# Patient Record
Sex: FEMALE | HISPANIC OR LATINO | URBAN - METROPOLITAN AREA
[De-identification: names, ages, dates, MRNs, and addresses within clinical notes are randomized per-mention and may not be internally consistent; named-entity substitution may affect disease eponyms.]

---

## 2020-02-18 ENCOUNTER — EMERGENCY (EMERGENCY)
Facility: HOSPITAL | Age: 20
LOS: 1 days | Discharge: HOME | End: 2020-02-18
Attending: EMERGENCY MEDICINE | Admitting: EMERGENCY MEDICINE
Payer: SELF-PAY

## 2020-02-18 VITALS
OXYGEN SATURATION: 98 % | SYSTOLIC BLOOD PRESSURE: 116 MMHG | RESPIRATION RATE: 16 BRPM | HEART RATE: 90 BPM | TEMPERATURE: 98 F | DIASTOLIC BLOOD PRESSURE: 77 MMHG

## 2020-02-18 VITALS
TEMPERATURE: 98 F | HEIGHT: 60 IN | DIASTOLIC BLOOD PRESSURE: 85 MMHG | OXYGEN SATURATION: 100 % | SYSTOLIC BLOOD PRESSURE: 120 MMHG | RESPIRATION RATE: 16 BRPM | HEART RATE: 98 BPM | WEIGHT: 104.94 LBS

## 2020-02-18 PROCEDURE — 99283 EMERGENCY DEPT VISIT LOW MDM: CPT

## 2020-02-18 RX ORDER — IBUPROFEN 200 MG
1 TABLET ORAL
Qty: 30 | Refills: 0
Start: 2020-02-18

## 2020-02-18 RX ORDER — CYCLOBENZAPRINE HYDROCHLORIDE 10 MG/1
1 TABLET, FILM COATED ORAL
Qty: 15 | Refills: 0
Start: 2020-02-18

## 2020-02-18 RX ORDER — LIDOCAINE 4 G/100G
1 CREAM TOPICAL
Qty: 30 | Refills: 0
Start: 2020-02-18 | End: 2020-03-18
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
## 2020-02-18 NOTE — ED PROVIDER NOTE - PATIENT PORTAL LINK FT
You can access the FollowMyHealth Patient Portal offered by [facility] by registering at the following website: [email or web address]. By joining [employer]’s FollowMyHealth portal, you will also be able to view your health information using other applications (apps) compatible with our system.

## 2020-02-18 NOTE — ED ADULT NURSE REASSESSMENT NOTE - NS ED NURSE REASSESS COMMENT FT1
pt does not want any medication, a/o x 4, resp even and unlabored, ambulatory to waitign room with steady gait

## 2020-02-18 NOTE — ED ADULT NURSE NOTE - NS TRANSFER PATIENT BELONGINGS
Pt seen due to <75% meal consumption x 6 meals. Clothing Pt seen due to <75% meal consumption x 6 meals.  Pt was placed on Ensure Enlive 09-29 due to RD observation of suboptimal oral intake.
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
## 2023-07-31 ENCOUNTER — APPOINTMENT (OUTPATIENT)
Dept: OBGYN | Facility: CLINIC | Age: 23
End: 2023-07-31
Payer: COMMERCIAL

## 2023-07-31 VITALS
DIASTOLIC BLOOD PRESSURE: 71 MMHG | WEIGHT: 110 LBS | HEIGHT: 60 IN | BODY MASS INDEX: 21.6 KG/M2 | SYSTOLIC BLOOD PRESSURE: 116 MMHG | HEART RATE: 66 BPM

## 2023-07-31 PROCEDURE — 99203 OFFICE O/P NEW LOW 30 MIN: CPT

## 2023-07-31 NOTE — PLAN
[FreeTextEntry1] : 22y/o presents with irregular periods  #Irregular periods -UPreg neg today -Labs sent -Requisition for pelvic sono provided RTO after chachoo  champ WRIGHT

## 2023-07-31 NOTE — REVIEW OF SYSTEMS
[Patient Intake Form Reviewed] : Patient intake form was reviewed [Constipation] : constipation [Abn Vaginal bleeding] : abnormal vaginal bleeding [Negative] : Heme/Lymph

## 2023-07-31 NOTE — HISTORY OF PRESENT ILLNESS
[FreeTextEntry1] : 24 y/o presents with irregular periods Pt reports period had been regular until Jan, when periods started to become irregular. Longest time without a period was 2 months. Cycles range from 21-45 days.  LMP June 1 Denies lifestyle changes. Pt reports hair loss and constipation since that time, sometimes going 10 days without BM. Has not had thryoid checked Sexually active with 1 male partner, not using contraception, attempting to conceive.   OBHx: TOP x1 GYNHx: denies PMHx: denies PSHx: denies Meds: denies All: NKDA SH: neg x3

## 2023-08-01 LAB
ESTIMATED AVERAGE GLUCOSE: 100 MG/DL
ESTRADIOL SERPL-MCNC: 110 PG/ML
FSH SERPL-MCNC: 4.3 IU/L
HBA1C MFR BLD HPLC: 5.1 %
HCG SERPL-MCNC: <1 MIU/ML
PROLACTIN SERPL-MCNC: 15.3 NG/ML
TSH SERPL-ACNC: 1.13 UIU/ML

## 2023-08-02 LAB
TESTOST FREE SERPL-MCNC: 1.3 PG/ML
TESTOST SERPL-MCNC: 11.6 NG/DL

## 2023-08-28 ENCOUNTER — APPOINTMENT (OUTPATIENT)
Dept: OBGYN | Facility: CLINIC | Age: 23
End: 2023-08-28
Payer: COMMERCIAL

## 2023-08-28 ENCOUNTER — ASOB RESULT (OUTPATIENT)
Age: 23
End: 2023-08-28

## 2023-08-28 VITALS
HEIGHT: 60 IN | SYSTOLIC BLOOD PRESSURE: 106 MMHG | DIASTOLIC BLOOD PRESSURE: 71 MMHG | HEART RATE: 73 BPM | WEIGHT: 103 LBS | BODY MASS INDEX: 20.22 KG/M2

## 2023-08-28 DIAGNOSIS — N92.6 IRREGULAR MENSTRUATION, UNSPECIFIED: ICD-10-CM

## 2023-08-28 PROCEDURE — 99213 OFFICE O/P EST LOW 20 MIN: CPT

## 2023-08-28 PROCEDURE — 76830 TRANSVAGINAL US NON-OB: CPT

## 2023-08-30 PROBLEM — N92.6 IRREGULAR PERIODS: Status: ACTIVE | Noted: 2023-07-31

## 2023-08-30 NOTE — PLAN
[FreeTextEntry1] : 24y/o presents for followup for irregular periods  #Irregular periods -Periods 21-45 days, never going longer than 6 weeks without a period -Labs and sono unremarkable, possible arcuate uterus, noncontributory  -Discussed ovulation prediction when trying to conceive with intent  champ WRIGHT

## 2023-08-30 NOTE — HISTORY OF PRESENT ILLNESS
[FreeTextEntry1] : 24y/o presents for followup for irregular periods.  LMP 8/18, 6 weeks from prior period.  No new symptoms since prior visit. Pt is open to pregnancy but not actively trying to conceive Labs and sonogram WNL, reviewed with pt

## 2023-12-02 NOTE — ED ADULT NURSE NOTE - TEMPLATE
FAMILY HISTORY:  Family history of cancer  Family history of heart disease  Family history of hypertension     MVC

## 2024-01-23 ENCOUNTER — LABORATORY RESULT (OUTPATIENT)
Age: 24
End: 2024-01-23

## 2024-01-23 ENCOUNTER — APPOINTMENT (OUTPATIENT)
Dept: OBGYN | Facility: CLINIC | Age: 24
End: 2024-01-23

## 2024-01-23 ENCOUNTER — ASOB RESULT (OUTPATIENT)
Age: 24
End: 2024-01-23

## 2024-01-23 ENCOUNTER — APPOINTMENT (OUTPATIENT)
Dept: OBGYN | Facility: CLINIC | Age: 24
End: 2024-01-23
Payer: COMMERCIAL

## 2024-01-23 VITALS
DIASTOLIC BLOOD PRESSURE: 73 MMHG | BODY MASS INDEX: 24.35 KG/M2 | WEIGHT: 124 LBS | HEART RATE: 88 BPM | SYSTOLIC BLOOD PRESSURE: 118 MMHG | HEIGHT: 60 IN

## 2024-01-23 DIAGNOSIS — O36.80X0 PREGNANCY WITH INCONCLUSIVE FETAL VIABILITY, NOT APPLICABLE OR UNSPECIFIED: ICD-10-CM

## 2024-01-23 PROCEDURE — 76817 TRANSVAGINAL US OBSTETRIC: CPT

## 2024-01-23 PROCEDURE — 99214 OFFICE O/P EST MOD 30 MIN: CPT

## 2024-01-23 RX ORDER — ASPIRIN 81 MG/1
81 TABLET, CHEWABLE ORAL
Qty: 60 | Refills: 3 | Status: ACTIVE | COMMUNITY
Start: 2024-01-23 | End: 1900-01-01

## 2024-01-23 NOTE — HISTORY OF PRESENT ILLNESS
[FreeTextEntry1] : 23 y/o presents to determine fetal viability TVUS today shows mono/di TIUP measuring 12w2d (ANGELIQUE 8/4/24) with +FH x2  Pt feels well, denies nausea/vomiting/cramping/VB  OBHx: TOP x1 GYNHx: denies PMHx: denies PSHx: denies Meds: denies All: NKDA SH: neg x3  Pt accepts blood transfusion Pt is accepting of a male physician

## 2024-01-23 NOTE — PLAN
[FreeTextEntry1] : 23y/o  at 12w2d (ANGELIQUE 24 by CRL) presents to determine fetal viability. Mono/di TIUP noted today.   #PNC -1st trimester labs today -NIPS today -Prenatal packet provided and reviewed -Cord collection discussed -Requisition for NT -Behavioral Health resources provided  #Mono/Di TIUP -Discussed associated risks, PTL, need for PTD -Requisition faxed to MFM for consult   RTO 4 weeks champ WRIGHT

## 2024-01-24 LAB
ABO + RH PNL BLD: NORMAL
ALBUMIN SERPL ELPH-MCNC: 4.5 G/DL
ALP BLD-CCNC: 56 U/L
ALT SERPL-CCNC: 120 U/L
ANION GAP SERPL CALC-SCNC: 15 MMOL/L
AST SERPL-CCNC: 54 U/L
BILIRUB SERPL-MCNC: 0.2 MG/DL
BLD GP AB SCN SERPL QL: NORMAL
BUN SERPL-MCNC: 9 MG/DL
CALCIUM SERPL-MCNC: 9.7 MG/DL
CHLORIDE SERPL-SCNC: 100 MMOL/L
CO2 SERPL-SCNC: 22 MMOL/L
CREAT SERPL-MCNC: 0.42 MG/DL
EGFR: 138 ML/MIN/1.73M2
ESTIMATED AVERAGE GLUCOSE: 91 MG/DL
GLUCOSE SERPL-MCNC: 57 MG/DL
HBA1C MFR BLD HPLC: 4.8 %
HBV SURFACE AG SER QL: NONREACTIVE
HCT VFR BLD CALC: 40.5 %
HCV AB SER QL: NONREACTIVE
HCV S/CO RATIO: 0.14 S/CO
HGB A MFR BLD: 97.5 %
HGB A2 MFR BLD: 2.5 %
HGB BLD-MCNC: 13.3 G/DL
HGB FRACT BLD-IMP: NORMAL
HIV1+2 AB SPEC QL IA.RAPID: NONREACTIVE
LEAD BLD-MCNC: <1 UG/DL
MCHC RBC-ENTMCNC: 30 PG
MCHC RBC-ENTMCNC: 32.8 GM/DL
MCV RBC AUTO: 91.2 FL
MEV IGG FLD QL IA: 18.6 AU/ML
MEV IGG+IGM SER-IMP: POSITIVE
PLATELET # BLD AUTO: 332 K/UL
POTASSIUM SERPL-SCNC: 5.8 MMOL/L
PROT SERPL-MCNC: 7.1 G/DL
RBC # BLD: 4.44 M/UL
RBC # FLD: 13.4 %
RUBV IGG FLD-ACNC: 1.4 INDEX
RUBV IGG SER-IMP: POSITIVE
SODIUM SERPL-SCNC: 136 MMOL/L
T GONDII AB SER-IMP: NEGATIVE
T GONDII AB SER-IMP: NEGATIVE
T GONDII IGG SER QL: <3 IU/ML
T GONDII IGM SER QL: <3 AU/ML
T PALLIDUM AB SER QL IA: NEGATIVE
TSH SERPL-ACNC: 0.36 UIU/ML
VZV AB TITR SER: POSITIVE
VZV IGG SER IF-ACNC: 316.1 INDEX
WBC # FLD AUTO: 10.6 K/UL

## 2024-01-26 LAB
B19V IGG SER QL IA: 7.08 INDEX
B19V IGG+IGM SER-IMP: NORMAL
B19V IGG+IGM SER-IMP: POSITIVE
B19V IGM FLD-ACNC: 0.2 INDEX
B19V IGM SER-ACNC: NEGATIVE
BACTERIA UR CULT: NORMAL
M TB IFN-G BLD-IMP: NEGATIVE
QUANTIFERON TB PLUS MITOGEN MINUS NIL: 7.87 IU/ML
QUANTIFERON TB PLUS NIL: 0.03 IU/ML
QUANTIFERON TB PLUS TB1 MINUS NIL: -0.01 IU/ML
QUANTIFERON TB PLUS TB2 MINUS NIL: -0.01 IU/ML

## 2024-01-27 LAB — FMR1 GENE MUT ANL BLD/T: NORMAL

## 2024-01-29 LAB — CFTR MUT TESTED BLD/T: NEGATIVE

## 2024-01-31 ENCOUNTER — APPOINTMENT (OUTPATIENT)
Dept: OBGYN | Facility: CLINIC | Age: 24
End: 2024-01-31
Payer: COMMERCIAL

## 2024-01-31 ENCOUNTER — ASOB RESULT (OUTPATIENT)
Age: 24
End: 2024-01-31

## 2024-01-31 VITALS
HEART RATE: 84 BPM | SYSTOLIC BLOOD PRESSURE: 121 MMHG | DIASTOLIC BLOOD PRESSURE: 69 MMHG | WEIGHT: 122 LBS | BODY MASS INDEX: 23.95 KG/M2 | HEIGHT: 60 IN

## 2024-01-31 LAB — AR GENE MUT ANL BLD/T: NORMAL

## 2024-01-31 PROCEDURE — 76802 OB US < 14 WKS ADDL FETUS: CPT

## 2024-01-31 PROCEDURE — 99213 OFFICE O/P EST LOW 20 MIN: CPT

## 2024-01-31 PROCEDURE — 76801 OB US < 14 WKS SINGLE FETUS: CPT

## 2024-01-31 NOTE — PLAN
[FreeTextEntry1] : 25 y/o P0 at 13w3d with mono-di TIUP with vaginal bleeding 2/2 subchorionic hematoma  Plan: - Sono reviewed with patient, reassurance provided - recommend pelvic rest, avoidance of strenuous physical activity - RTO tomorrow for NT

## 2024-01-31 NOTE — HISTORY OF PRESENT ILLNESS
[FreeTextEntry1] : 25 y/o P0 at 13w3d with mono-di TIUP presents with vaginal bleeding x1 day. Reports bleeding started off as light spotting, then became heavier throughout the day, soaking her pad. Denies cramping, pain. TVUS confirms live TIUP with large 7cm subchorionic hematoma.

## 2024-02-01 ENCOUNTER — APPOINTMENT (OUTPATIENT)
Dept: ANTEPARTUM | Facility: CLINIC | Age: 24
End: 2024-02-01
Payer: MEDICAID

## 2024-02-01 ENCOUNTER — NON-APPOINTMENT (OUTPATIENT)
Age: 24
End: 2024-02-01

## 2024-02-01 ENCOUNTER — ASOB RESULT (OUTPATIENT)
Age: 24
End: 2024-02-01

## 2024-02-01 PROCEDURE — 99202 OFFICE O/P NEW SF 15 MIN: CPT | Mod: 25

## 2024-02-01 PROCEDURE — 76801 OB US < 14 WKS SINGLE FETUS: CPT

## 2024-02-01 PROCEDURE — 76813 OB US NUCHAL MEAS 1 GEST: CPT

## 2024-02-01 PROCEDURE — 76802 OB US < 14 WKS ADDL FETUS: CPT

## 2024-02-01 PROCEDURE — 76817 TRANSVAGINAL US OBSTETRIC: CPT

## 2024-02-01 PROCEDURE — 76814 OB US NUCHAL MEAS ADD-ON: CPT

## 2024-02-05 ENCOUNTER — NON-APPOINTMENT (OUTPATIENT)
Age: 24
End: 2024-02-05

## 2024-02-15 ENCOUNTER — ASOB RESULT (OUTPATIENT)
Age: 24
End: 2024-02-15

## 2024-02-15 ENCOUNTER — APPOINTMENT (OUTPATIENT)
Dept: ANTEPARTUM | Facility: CLINIC | Age: 24
End: 2024-02-15
Payer: MEDICAID

## 2024-02-15 PROCEDURE — 76816 OB US FOLLOW-UP PER FETUS: CPT

## 2024-02-15 PROCEDURE — 76817 TRANSVAGINAL US OBSTETRIC: CPT

## 2024-02-15 PROCEDURE — 99212 OFFICE O/P EST SF 10 MIN: CPT | Mod: 25

## 2024-02-23 ENCOUNTER — APPOINTMENT (OUTPATIENT)
Dept: OBGYN | Facility: CLINIC | Age: 24
End: 2024-02-23
Payer: COMMERCIAL

## 2024-02-23 VITALS
WEIGHT: 126 LBS | HEART RATE: 85 BPM | SYSTOLIC BLOOD PRESSURE: 116 MMHG | DIASTOLIC BLOOD PRESSURE: 74 MMHG | BODY MASS INDEX: 24.74 KG/M2 | HEIGHT: 60 IN

## 2024-02-23 DIAGNOSIS — O41.8X90 OTHER SPECIFIED DISORDERS OF AMNIOTIC FLUID AND MEMBRANES, UNSPECIFIED TRIMESTER, NOT APPLICABLE OR UNSPECIFIED: ICD-10-CM

## 2024-02-23 DIAGNOSIS — O46.8X9 OTHER SPECIFIED DISORDERS OF AMNIOTIC FLUID AND MEMBRANES, UNSPECIFIED TRIMESTER, NOT APPLICABLE OR UNSPECIFIED: ICD-10-CM

## 2024-02-23 DIAGNOSIS — O28.5 ABNORMAL CHROMOSOMAL AND GENETIC FINDING ON ANTENATAL SCREENING OF MOTHER: ICD-10-CM

## 2024-02-23 DIAGNOSIS — O30.039 TWIN PREGNANCY, MONOCHORIONIC/DIAMNIOTIC, UNSPECIFIED TRIMESTER: ICD-10-CM

## 2024-02-23 PROCEDURE — 0500F INITIAL PRENATAL CARE VISIT: CPT

## 2024-02-28 LAB
AFP MOM: 3.44
AFP VALUE: 143.5 NG/ML
ALPHA FETOPROTEIN SERUM COMMENT: NORMAL
ALPHA FETOPROTEIN SERUM INTERPRETATION: NORMAL
ALPHA FETOPROTEIN SERUM RESULTS: NORMAL
ALPHA FETOPROTEIN SERUM TEST RESULTS: NORMAL
GESTATIONAL AGE BASED ON: NORMAL
GESTATIONAL AGE ON COLLECTION DATE: 16.7 WEEKS
INSULIN DEP DIABETES: NO
MATERNAL AGE AT EDD AFP: 26.5 YR
MULTIPLE GESTATION: NORMAL
OSBR RISK 1 IN: 233
RACE: NORMAL
WEIGHT AFP: 126 LBS

## 2024-03-04 ENCOUNTER — APPOINTMENT (OUTPATIENT)
Dept: OBGYN | Facility: CLINIC | Age: 24
End: 2024-03-04
Payer: MEDICAID

## 2024-03-04 ENCOUNTER — NON-APPOINTMENT (OUTPATIENT)
Age: 24
End: 2024-03-04

## 2024-03-04 VITALS
HEIGHT: 60 IN | DIASTOLIC BLOOD PRESSURE: 69 MMHG | SYSTOLIC BLOOD PRESSURE: 124 MMHG | HEART RATE: 137 BPM | BODY MASS INDEX: 23.75 KG/M2 | WEIGHT: 121 LBS

## 2024-03-04 DIAGNOSIS — O03.9 COMPLETE OR UNSPECIFIED SPONTANEOUS ABORTION W/OUT COMPLICATION: ICD-10-CM

## 2024-03-04 PROCEDURE — 99214 OFFICE O/P EST MOD 30 MIN: CPT

## 2024-03-04 RX ORDER — KETOROLAC TROMETHAMINE 10 MG/1
10 TABLET, FILM COATED ORAL 3 TIMES DAILY
Qty: 15 | Refills: 0 | Status: ACTIVE | COMMUNITY
Start: 2024-03-04 | End: 1900-01-01

## 2024-03-05 ENCOUNTER — APPOINTMENT (OUTPATIENT)
Dept: ANTEPARTUM | Facility: CLINIC | Age: 24
End: 2024-03-05

## 2024-03-05 ENCOUNTER — NON-APPOINTMENT (OUTPATIENT)
Age: 24
End: 2024-03-05

## 2024-03-05 ENCOUNTER — TRANSCRIPTION ENCOUNTER (OUTPATIENT)
Age: 24
End: 2024-03-05

## 2024-03-05 ENCOUNTER — OUTPATIENT (OUTPATIENT)
Dept: OUTPATIENT SERVICES | Facility: HOSPITAL | Age: 24
LOS: 1 days | End: 2024-03-05

## 2024-03-05 VITALS
RESPIRATION RATE: 18 BRPM | SYSTOLIC BLOOD PRESSURE: 101 MMHG | TEMPERATURE: 99 F | HEART RATE: 98 BPM | HEIGHT: 60 IN | WEIGHT: 125 LBS | OXYGEN SATURATION: 100 % | DIASTOLIC BLOOD PRESSURE: 65 MMHG

## 2024-03-05 DIAGNOSIS — O03.9 COMPLETE OR UNSPECIFIED SPONTANEOUS ABORTION WITHOUT COMPLICATION: ICD-10-CM

## 2024-03-05 LAB
BLD GP AB SCN SERPL QL: NEGATIVE — SIGNIFICANT CHANGE UP
HCT VFR BLD CALC: 20 % — CRITICAL LOW (ref 34.5–45)
HCT VFR BLD CALC: 21.2 %
HGB BLD-MCNC: 6.6 G/DL — CRITICAL LOW (ref 11.5–15.5)
HGB BLD-MCNC: 6.8 G/DL
MCHC RBC-ENTMCNC: 28.1 PG — SIGNIFICANT CHANGE UP (ref 27–34)
MCHC RBC-ENTMCNC: 28.5 PG
MCHC RBC-ENTMCNC: 32.1 GM/DL
MCHC RBC-ENTMCNC: 33 GM/DL — SIGNIFICANT CHANGE UP (ref 32–36)
MCV RBC AUTO: 85.1 FL — SIGNIFICANT CHANGE UP (ref 80–100)
MCV RBC AUTO: 88.7 FL
NRBC # BLD: 0 /100 WBCS — SIGNIFICANT CHANGE UP (ref 0–0)
NRBC # FLD: 0.03 K/UL — HIGH (ref 0–0)
PLATELET # BLD AUTO: 438 K/UL — HIGH (ref 150–400)
PLATELET # BLD AUTO: 456 K/UL
RBC # BLD: 2.35 M/UL — LOW (ref 3.8–5.2)
RBC # BLD: 2.39 M/UL
RBC # FLD: 13.3 %
RBC # FLD: 13.4 % — SIGNIFICANT CHANGE UP (ref 10.3–14.5)
RH IG SCN BLD-IMP: POSITIVE — SIGNIFICANT CHANGE UP
RH IG SCN BLD-IMP: POSITIVE — SIGNIFICANT CHANGE UP
WBC # BLD: 19.97 K/UL — HIGH (ref 3.8–10.5)
WBC # FLD AUTO: 19.97 K/UL — HIGH (ref 3.8–10.5)
WBC # FLD AUTO: 26.32 K/UL

## 2024-03-05 NOTE — PROVIDER CONTACT NOTE (CRITICAL VALUE NOTIFICATION) - ACTION/TREATMENT ORDERED:
Dr. Amaral called spoke to Cici VILLA regarding PT H&H  6.6, 20, this was communicated to Dr. Amaral, who said she knows about H&H and will transfused patient in the am prior to her procedure

## 2024-03-05 NOTE — PROVIDER CONTACT NOTE (CRITICAL VALUE NOTIFICATION) - BACKGROUND
25 yo female dx with retained placenta experiencing vaginal bleeding scheduled for Suction Dilatation Curettage on 3/6/24

## 2024-03-05 NOTE — H&P PST ADULT - PROBLEM SELECTOR PLAN 1
Scheduled for Suction Dilation and Curettage on 03/06/24.  Preop instructions provided and patient verbalizes understanding.  Labs done and results pending.  Famotidine provided with instructions. Patient verbalized understanding.
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
## 2024-03-06 ENCOUNTER — APPOINTMENT (OUTPATIENT)
Dept: OBGYN | Facility: HOSPITAL | Age: 24
End: 2024-03-06

## 2024-03-06 ENCOUNTER — TRANSCRIPTION ENCOUNTER (OUTPATIENT)
Age: 24
End: 2024-03-06

## 2024-03-06 ENCOUNTER — INPATIENT (INPATIENT)
Facility: HOSPITAL | Age: 24
LOS: 2 days | Discharge: HOME | End: 2024-03-09
Attending: STUDENT IN AN ORGANIZED HEALTH CARE EDUCATION/TRAINING PROGRAM | Admitting: STUDENT IN AN ORGANIZED HEALTH CARE EDUCATION/TRAINING PROGRAM
Payer: MEDICAID

## 2024-03-06 ENCOUNTER — RESULT REVIEW (OUTPATIENT)
Age: 24
End: 2024-03-06

## 2024-03-06 VITALS
TEMPERATURE: 102 F | DIASTOLIC BLOOD PRESSURE: 58 MMHG | OXYGEN SATURATION: 98 % | HEART RATE: 120 BPM | RESPIRATION RATE: 20 BRPM | SYSTOLIC BLOOD PRESSURE: 96 MMHG | HEIGHT: 60 IN

## 2024-03-06 DIAGNOSIS — A41.9 SEPSIS, UNSPECIFIED ORGANISM: ICD-10-CM

## 2024-03-06 PROBLEM — O03.9 COMPLETE OR UNSPECIFIED SPONTANEOUS ABORTION WITHOUT COMPLICATION: Chronic | Status: ACTIVE | Noted: 2024-03-05

## 2024-03-06 LAB
ADD ON TEST-SPECIMEN IN LAB: SIGNIFICANT CHANGE UP
ALBUMIN SERPL ELPH-MCNC: 3.1 G/DL — LOW (ref 3.3–5)
ALP SERPL-CCNC: 80 U/L — SIGNIFICANT CHANGE UP (ref 40–120)
ALT FLD-CCNC: 13 U/L — SIGNIFICANT CHANGE UP (ref 4–33)
ANION GAP SERPL CALC-SCNC: 11 MMOL/L — SIGNIFICANT CHANGE UP (ref 7–14)
ANISOCYTOSIS BLD QL: SLIGHT — SIGNIFICANT CHANGE UP
APPEARANCE UR: CLEAR — SIGNIFICANT CHANGE UP
APTT BLD: 28.7 SEC — SIGNIFICANT CHANGE UP (ref 24.5–35.6)
AST SERPL-CCNC: 27 U/L — SIGNIFICANT CHANGE UP (ref 4–32)
BACTERIA # UR AUTO: NEGATIVE /HPF — SIGNIFICANT CHANGE UP
BASE EXCESS BLDV CALC-SCNC: -2.3 MMOL/L — LOW (ref -2–3)
BASOPHILS # BLD AUTO: 0 K/UL — SIGNIFICANT CHANGE UP (ref 0–0.2)
BASOPHILS # BLD AUTO: 0.05 K/UL — SIGNIFICANT CHANGE UP (ref 0–0.2)
BASOPHILS NFR BLD AUTO: 0 % — SIGNIFICANT CHANGE UP (ref 0–2)
BASOPHILS NFR BLD AUTO: 0.3 % — SIGNIFICANT CHANGE UP (ref 0–2)
BILIRUB SERPL-MCNC: <0.2 MG/DL — SIGNIFICANT CHANGE UP (ref 0.2–1.2)
BILIRUB UR-MCNC: NEGATIVE — SIGNIFICANT CHANGE UP
BLD GP AB SCN SERPL QL: NEGATIVE — SIGNIFICANT CHANGE UP
BLOOD GAS VENOUS COMPREHENSIVE RESULT: SIGNIFICANT CHANGE UP
BUN SERPL-MCNC: 8 MG/DL — SIGNIFICANT CHANGE UP (ref 7–23)
CALCIUM SERPL-MCNC: 8.4 MG/DL — SIGNIFICANT CHANGE UP (ref 8.4–10.5)
CAST: 0 /LPF — SIGNIFICANT CHANGE UP (ref 0–4)
CHLORIDE BLDV-SCNC: 104 MMOL/L — SIGNIFICANT CHANGE UP (ref 96–108)
CHLORIDE SERPL-SCNC: 100 MMOL/L — SIGNIFICANT CHANGE UP (ref 98–107)
CO2 BLDV-SCNC: 23.5 MMOL/L — SIGNIFICANT CHANGE UP (ref 22–26)
CO2 SERPL-SCNC: 20 MMOL/L — LOW (ref 22–31)
COLOR SPEC: YELLOW — SIGNIFICANT CHANGE UP
CREAT SERPL-MCNC: 0.34 MG/DL — LOW (ref 0.5–1.3)
DIFF PNL FLD: ABNORMAL
E COLI DNA BLD POS QL NAA+NON-PROBE: SIGNIFICANT CHANGE UP
EGFR: 145 ML/MIN/1.73M2 — SIGNIFICANT CHANGE UP
ELLIPTOCYTES BLD QL SMEAR: SLIGHT — SIGNIFICANT CHANGE UP
EOSINOPHIL # BLD AUTO: 0 K/UL — SIGNIFICANT CHANGE UP (ref 0–0.5)
EOSINOPHIL # BLD AUTO: 0.01 K/UL — SIGNIFICANT CHANGE UP (ref 0–0.5)
EOSINOPHIL NFR BLD AUTO: 0 % — SIGNIFICANT CHANGE UP (ref 0–6)
EOSINOPHIL NFR BLD AUTO: 0.1 % — SIGNIFICANT CHANGE UP (ref 0–6)
FIBRINOGEN PPP-MCNC: 743 MG/DL — HIGH (ref 200–465)
FLUAV AG NPH QL: SIGNIFICANT CHANGE UP
FLUBV AG NPH QL: SIGNIFICANT CHANGE UP
GAS PNL BLDV: 132 MMOL/L — LOW (ref 136–145)
GAS PNL BLDV: SIGNIFICANT CHANGE UP
GLUCOSE BLDV-MCNC: 96 MG/DL — SIGNIFICANT CHANGE UP (ref 70–99)
GLUCOSE SERPL-MCNC: 96 MG/DL — SIGNIFICANT CHANGE UP (ref 70–99)
GLUCOSE UR QL: NEGATIVE MG/DL — SIGNIFICANT CHANGE UP
GRAM STN FLD: ABNORMAL
HCG SERPL-ACNC: 4226 MIU/ML — SIGNIFICANT CHANGE UP
HCO3 BLDV-SCNC: 22 MMOL/L — SIGNIFICANT CHANGE UP (ref 22–29)
HCT VFR BLD CALC: 20 % — CRITICAL LOW (ref 34.5–45)
HCT VFR BLD CALC: 26.9 % — LOW (ref 34.5–45)
HCT VFR BLDA CALC: 21 % — CRITICAL LOW (ref 34.5–46.5)
HGB BLD CALC-MCNC: 6.9 G/DL — CRITICAL LOW (ref 11.7–16.1)
HGB BLD-MCNC: 6.5 G/DL — CRITICAL LOW (ref 11.5–15.5)
HGB BLD-MCNC: 9.1 G/DL — LOW (ref 11.5–15.5)
HYPOCHROMIA BLD QL: SLIGHT — SIGNIFICANT CHANGE UP
IANC: 12.62 K/UL — HIGH (ref 1.8–7.4)
IANC: 17.44 K/UL — HIGH (ref 1.8–7.4)
IMM GRANULOCYTES NFR BLD AUTO: 1.8 % — HIGH (ref 0–0.9)
INR BLD: 1.06 RATIO — SIGNIFICANT CHANGE UP (ref 0.85–1.18)
KETONES UR-MCNC: NEGATIVE MG/DL — SIGNIFICANT CHANGE UP
LACTATE BLDV-MCNC: 1.3 MMOL/L — SIGNIFICANT CHANGE UP (ref 0.5–2)
LEUKOCYTE ESTERASE UR-ACNC: ABNORMAL
LYMPHOCYTES # BLD AUTO: 0.88 K/UL — LOW (ref 1–3.3)
LYMPHOCYTES # BLD AUTO: 1.9 K/UL — SIGNIFICANT CHANGE UP (ref 1–3.3)
LYMPHOCYTES # BLD AUTO: 11.4 % — LOW (ref 13–44)
LYMPHOCYTES # BLD AUTO: 4.5 % — LOW (ref 13–44)
MANUAL SMEAR VERIFICATION: SIGNIFICANT CHANGE UP
MCHC RBC-ENTMCNC: 27.5 PG — SIGNIFICANT CHANGE UP (ref 27–34)
MCHC RBC-ENTMCNC: 27.8 PG — SIGNIFICANT CHANGE UP (ref 27–34)
MCHC RBC-ENTMCNC: 32.5 GM/DL — SIGNIFICANT CHANGE UP (ref 32–36)
MCHC RBC-ENTMCNC: 33.8 GM/DL — SIGNIFICANT CHANGE UP (ref 32–36)
MCV RBC AUTO: 82.3 FL — SIGNIFICANT CHANGE UP (ref 80–100)
MCV RBC AUTO: 84.7 FL — SIGNIFICANT CHANGE UP (ref 80–100)
METAMYELOCYTES # FLD: 0.9 % — SIGNIFICANT CHANGE UP (ref 0–1)
METHOD TYPE: SIGNIFICANT CHANGE UP
MONOCYTES # BLD AUTO: 0.67 K/UL — SIGNIFICANT CHANGE UP (ref 0–0.9)
MONOCYTES # BLD AUTO: 1.17 K/UL — HIGH (ref 0–0.9)
MONOCYTES NFR BLD AUTO: 3.5 % — SIGNIFICANT CHANGE UP (ref 2–14)
MONOCYTES NFR BLD AUTO: 7 % — SIGNIFICANT CHANGE UP (ref 2–14)
NEUTROPHILS # BLD AUTO: 13.48 K/UL — HIGH (ref 1.8–7.4)
NEUTROPHILS # BLD AUTO: 17.44 K/UL — HIGH (ref 1.8–7.4)
NEUTROPHILS NFR BLD AUTO: 78.1 % — HIGH (ref 43–77)
NEUTROPHILS NFR BLD AUTO: 89.8 % — HIGH (ref 43–77)
NEUTS BAND # BLD: 2.6 % — SIGNIFICANT CHANGE UP (ref 0–6)
NITRITE UR-MCNC: NEGATIVE — SIGNIFICANT CHANGE UP
NRBC # BLD: 0 /100 WBCS — SIGNIFICANT CHANGE UP (ref 0–0)
NRBC # FLD: 0.06 K/UL — HIGH (ref 0–0)
OVALOCYTES BLD QL SMEAR: SLIGHT — SIGNIFICANT CHANGE UP
PCO2 BLDV: 37 MMHG — LOW (ref 39–52)
PH BLDV: 7.39 — SIGNIFICANT CHANGE UP (ref 7.32–7.43)
PH UR: 6.5 — SIGNIFICANT CHANGE UP (ref 5–8)
PLAT MORPH BLD: ABNORMAL
PLATELET # BLD AUTO: 338 K/UL — SIGNIFICANT CHANGE UP (ref 150–400)
PLATELET # BLD AUTO: 428 K/UL — HIGH (ref 150–400)
PLATELET COUNT - ESTIMATE: NORMAL — SIGNIFICANT CHANGE UP
PO2 BLDV: 50 MMHG — HIGH (ref 25–45)
POIKILOCYTOSIS BLD QL AUTO: SIGNIFICANT CHANGE UP
POLYCHROMASIA BLD QL SMEAR: SLIGHT — SIGNIFICANT CHANGE UP
POTASSIUM BLDV-SCNC: 3.5 MMOL/L — SIGNIFICANT CHANGE UP (ref 3.5–5.1)
POTASSIUM SERPL-MCNC: 4.2 MMOL/L — SIGNIFICANT CHANGE UP (ref 3.5–5.3)
POTASSIUM SERPL-SCNC: 4.2 MMOL/L — SIGNIFICANT CHANGE UP (ref 3.5–5.3)
PROT SERPL-MCNC: 6.9 G/DL — SIGNIFICANT CHANGE UP (ref 6–8.3)
PROT UR-MCNC: NEGATIVE MG/DL — SIGNIFICANT CHANGE UP
PROTHROM AB SERPL-ACNC: 12 SEC — SIGNIFICANT CHANGE UP (ref 9.5–13)
RBC # BLD: 2.36 M/UL — LOW (ref 3.8–5.2)
RBC # BLD: 3.27 M/UL — LOW (ref 3.8–5.2)
RBC # FLD: 13.6 % — SIGNIFICANT CHANGE UP (ref 10.3–14.5)
RBC # FLD: 13.8 % — SIGNIFICANT CHANGE UP (ref 10.3–14.5)
RBC BLD AUTO: ABNORMAL
RBC CASTS # UR COMP ASSIST: 2 /HPF — SIGNIFICANT CHANGE UP (ref 0–4)
REVIEW: SIGNIFICANT CHANGE UP
RH IG SCN BLD-IMP: POSITIVE — SIGNIFICANT CHANGE UP
RSV RNA NPH QL NAA+NON-PROBE: SIGNIFICANT CHANGE UP
SAO2 % BLDV: 82.5 % — SIGNIFICANT CHANGE UP (ref 67–88)
SARS-COV-2 RNA SPEC QL NAA+PROBE: SIGNIFICANT CHANGE UP
SCHISTOCYTES BLD QL AUTO: SLIGHT — SIGNIFICANT CHANGE UP
SODIUM SERPL-SCNC: 131 MMOL/L — LOW (ref 135–145)
SP GR SPEC: 1.01 — SIGNIFICANT CHANGE UP (ref 1–1.03)
SPECIMEN SOURCE: SIGNIFICANT CHANGE UP
SQUAMOUS # UR AUTO: 3 /HPF — SIGNIFICANT CHANGE UP (ref 0–5)
TARGETS BLD QL SMEAR: SLIGHT — SIGNIFICANT CHANGE UP
UROBILINOGEN FLD QL: 0.2 MG/DL — SIGNIFICANT CHANGE UP (ref 0.2–1)
WBC # BLD: 16.7 K/UL — HIGH (ref 3.8–10.5)
WBC # BLD: 19.39 K/UL — HIGH (ref 3.8–10.5)
WBC # FLD AUTO: 16.7 K/UL — HIGH (ref 3.8–10.5)
WBC # FLD AUTO: 19.39 K/UL — HIGH (ref 3.8–10.5)
WBC UR QL: 3 /HPF — SIGNIFICANT CHANGE UP (ref 0–5)

## 2024-03-06 PROCEDURE — 59821 TREATMENT OF MISCARRIAGE: CPT | Mod: GC

## 2024-03-06 PROCEDURE — 71045 X-RAY EXAM CHEST 1 VIEW: CPT | Mod: 26

## 2024-03-06 PROCEDURE — 88305 TISSUE EXAM BY PATHOLOGIST: CPT | Mod: 26

## 2024-03-06 PROCEDURE — 99285 EMERGENCY DEPT VISIT HI MDM: CPT

## 2024-03-06 RX ORDER — HEPARIN SODIUM 5000 [USP'U]/ML
5000 INJECTION INTRAVENOUS; SUBCUTANEOUS EVERY 12 HOURS
Refills: 0 | Status: DISCONTINUED | OUTPATIENT
Start: 2024-03-06 | End: 2024-03-09

## 2024-03-06 RX ORDER — SODIUM CHLORIDE 9 MG/ML
1400 INJECTION, SOLUTION INTRAVENOUS ONCE
Refills: 0 | Status: COMPLETED | OUTPATIENT
Start: 2024-03-06 | End: 2024-03-06

## 2024-03-06 RX ORDER — IBUPROFEN 200 MG
600 TABLET ORAL EVERY 6 HOURS
Refills: 0 | Status: DISCONTINUED | OUTPATIENT
Start: 2024-03-06 | End: 2024-03-09

## 2024-03-06 RX ORDER — METOCLOPRAMIDE HCL 10 MG
10 TABLET ORAL ONCE
Refills: 0 | Status: COMPLETED | OUTPATIENT
Start: 2024-03-06 | End: 2024-03-06

## 2024-03-06 RX ORDER — ONDANSETRON 8 MG/1
4 TABLET, FILM COATED ORAL ONCE
Refills: 0 | Status: DISCONTINUED | OUTPATIENT
Start: 2024-03-06 | End: 2024-03-06

## 2024-03-06 RX ORDER — ERTAPENEM SODIUM 1 G/1
1000 INJECTION, POWDER, LYOPHILIZED, FOR SOLUTION INTRAMUSCULAR; INTRAVENOUS EVERY 24 HOURS
Refills: 0 | Status: DISCONTINUED | OUTPATIENT
Start: 2024-03-06 | End: 2024-03-07

## 2024-03-06 RX ORDER — AMPICILLIN TRIHYDRATE 250 MG
1 CAPSULE ORAL ONCE
Refills: 0 | Status: DISCONTINUED | OUTPATIENT
Start: 2024-03-06 | End: 2024-03-06

## 2024-03-06 RX ORDER — ACETAMINOPHEN 500 MG
975 TABLET ORAL EVERY 6 HOURS
Refills: 0 | Status: DISCONTINUED | OUTPATIENT
Start: 2024-03-06 | End: 2024-03-09

## 2024-03-06 RX ORDER — SODIUM CHLORIDE 9 MG/ML
1000 INJECTION, SOLUTION INTRAVENOUS
Refills: 0 | Status: DISCONTINUED | OUTPATIENT
Start: 2024-03-06 | End: 2024-03-07

## 2024-03-06 RX ORDER — INFLUENZA VIRUS VACCINE 15; 15; 15; 15 UG/.5ML; UG/.5ML; UG/.5ML; UG/.5ML
0.5 SUSPENSION INTRAMUSCULAR ONCE
Refills: 0 | Status: COMPLETED | OUTPATIENT
Start: 2024-03-06 | End: 2024-03-06

## 2024-03-06 RX ORDER — ACETAMINOPHEN 500 MG
1000 TABLET ORAL ONCE
Refills: 0 | Status: COMPLETED | OUTPATIENT
Start: 2024-03-06 | End: 2024-03-06

## 2024-03-06 RX ORDER — HYDROMORPHONE HYDROCHLORIDE 2 MG/ML
0.5 INJECTION INTRAMUSCULAR; INTRAVENOUS; SUBCUTANEOUS
Refills: 0 | Status: DISCONTINUED | OUTPATIENT
Start: 2024-03-06 | End: 2024-03-06

## 2024-03-06 RX ORDER — ERTAPENEM SODIUM 1 G/1
1000 INJECTION, POWDER, LYOPHILIZED, FOR SOLUTION INTRAMUSCULAR; INTRAVENOUS ONCE
Refills: 0 | Status: COMPLETED | OUTPATIENT
Start: 2024-03-06 | End: 2024-03-06

## 2024-03-06 RX ORDER — GENTAMICIN SULFATE 40 MG/ML
80 VIAL (ML) INJECTION ONCE
Refills: 0 | Status: DISCONTINUED | OUTPATIENT
Start: 2024-03-06 | End: 2024-03-06

## 2024-03-06 RX ADMIN — ERTAPENEM SODIUM 120 MILLIGRAM(S): 1 INJECTION, POWDER, LYOPHILIZED, FOR SOLUTION INTRAMUSCULAR; INTRAVENOUS at 06:33

## 2024-03-06 RX ADMIN — HEPARIN SODIUM 5000 UNIT(S): 5000 INJECTION INTRAVENOUS; SUBCUTANEOUS at 18:06

## 2024-03-06 RX ADMIN — Medication 400 MILLIGRAM(S): at 06:34

## 2024-03-06 RX ADMIN — Medication 10 MILLIGRAM(S): at 07:05

## 2024-03-06 RX ADMIN — ERTAPENEM SODIUM 120 MILLIGRAM(S): 1 INJECTION, POWDER, LYOPHILIZED, FOR SOLUTION INTRAMUSCULAR; INTRAVENOUS at 11:29

## 2024-03-06 RX ADMIN — SODIUM CHLORIDE 100 MILLILITER(S): 9 INJECTION, SOLUTION INTRAVENOUS at 21:27

## 2024-03-06 RX ADMIN — Medication 600 MILLIGRAM(S): at 18:06

## 2024-03-06 RX ADMIN — SODIUM CHLORIDE 1400 MILLILITER(S): 9 INJECTION, SOLUTION INTRAVENOUS at 07:05

## 2024-03-06 RX ADMIN — SODIUM CHLORIDE 100 MILLILITER(S): 9 INJECTION, SOLUTION INTRAVENOUS at 11:35

## 2024-03-06 RX ADMIN — Medication 975 MILLIGRAM(S): at 11:37

## 2024-03-06 NOTE — ED ADULT NURSE NOTE - OBJECTIVE STATEMENT
Pt received on stretcher, A&Ox4, no labored breathing c/o pelvic pain 9/10, pt is febrile and tachycardic, pt arrived with her . Pt placed on cardiac monitor.

## 2024-03-06 NOTE — BRIEF OPERATIVE NOTE - OPERATION/FINDINGS
Mucopurulent discharge noted at cervical os. Thin endometrial stripe noted at end of case. Mucopurulent discharge noted at cervical os. Significant tissue removed from uterus. Thin endometrial stripe noted at end of case.

## 2024-03-06 NOTE — DISCHARGE NOTE PROVIDER - HOSPITAL COURSE
Patient presented to the ED initially for transfusion for anemia on PSTs the day prior. Upon presentation to the ED, patient was found to be tachycardic with HR in the 120s and febrile at 102.2*F. With her clinical history, there was concern for septic /retained placenta. She underwent an emergent D&C under ultrasound guidance and was given Ertapenem. She was additionally given 1U of pRBCs as well as rectal cytotec. Patient was kept overnight for an additional dose of Ertapenem and further monitoring. Patient presented to the ED initially for transfusion for anemia on PSTs the day prior. Upon presentation to the ED, patient was found to be tachycardic with HR in the 120s and febrile at 102.2*F. With her clinical history, there was concern for septic /retained placenta. She underwent an emergent D&C under ultrasound guidance and was given Ertapenem. She was additionally given 1U of pRBCs as well as rectal Cytotec. During POD#0, cultures returned overnight w/ E. coli for which ID was consulted. ID recommended     Patient presented to the ED initially for transfusion for anemia on PSTs the day prior. Upon presentation to the ED, patient was found to be tachycardic with HR in the 120s and febrile at 102.2*F. With her clinical history, there was concern for septic /retained placenta. She underwent an emergent D&C under ultrasound guidance and was given Ertapenem. She was additionally given 1U of pRBCs as well as rectal Cytotec. During POD#0, cultures returned overnight w/ E. coli for which ID was consulted. ID recommended transition to ceftriaxone while inpatient and repeat blood cultures. Repeat blood cultures were obtained on POD1 and had no bacterial growth. Leukocytosis resolved and patient remained afebrile postoperatively. ID recommended transition to Ciprofloxacin on discharge. On day of discharge patient was vitally stable, pain was well controlled, was ambulating, tolerating PO, voiding spontaneously.      Patient presented to the ED initially for transfusion for anemia on PSTs the day prior. Upon presentation to the ED, patient was found to be tachycardic with HR in the 120s and febrile at 102.2*F. With her clinical history, there was concern for septic /retained placenta. She underwent an emergent D&C under ultrasound guidance and was given Ertapenem. She was additionally given 1U of pRBCs as well as rectal Cytotec. During POD#0, cultures returned overnight w/ E. coli for which ID was consulted. ID recommended transition to Ceftriaxone while inpatient and repeat blood cultures. Repeat blood cultures were obtained on POD1 and had no bacterial growth. Leukocytosis resolved and patient remained afebrile postoperatively. ID recommended transition to Ciprofloxacin on discharge. On day of discharge patient was vitally stable, pain was well controlled, was ambulating, tolerating PO, and voiding spontaneously.
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
## 2024-03-06 NOTE — ED PROVIDER NOTE - PHYSICAL EXAMINATION
Physical Exam:  Gen: NAD, AOx3, non-toxic appearing  Head: NCAT  HEENT: EOMI, PEERLA, normal conjunctiva, tongue midline, oral mucosa moist  Lung: CTAB, no respiratory distress, no wheezes/rhonchi/rales B/L, speaking in full sentences  CV: RRR, no murmurs, rubs or gallops  Abd: soft, tender in bilateral lower quadrants L>R, ND, no guarding, no rigidity   (chaperoned by Nurse Twila Holder): normal external female genitalia, copious yellow discharge, tender on bimanual exam L>R  MSK: no visible deformities, ROM normal in UE/LE, no back pain  Neuro: No focal sensory or motor deficits  Skin: Warm, well perfused, no rash, no leg swelling  Psych: normal affect, calm Physical Exam:  Gen: NAD, AOx3, non-toxic appearing  Head: NCAT  HEENT: EOMI, PEERLA, normal conjunctiva, tongue midline, oral mucosa moist  Lung: CTAB, no respiratory distress, no wheezes/rhonchi/rales B/L, speaking in full sentences  CV: RRR, no murmurs, rubs or gallops  Abd: soft, tender in bilateral lower quadrants L>R, ND, no guarding, no rigidity   (chaperoned by Nurse Twila Holder): normal external female genitalia, copious purulent discharge, tender on bimanual exam L>R  MSK: no visible deformities, ROM normal in UE/LE, no back pain  Neuro: No focal sensory or motor deficits  Skin: Warm, well perfused, no rash, no leg swelling  Psych: normal affect, calm

## 2024-03-06 NOTE — BRIEF OPERATIVE NOTE - NSICDXBRIEFPROCEDURE_GEN_ALL_CORE_FT
PROCEDURES:  Dilation and curettage of uterus using suction with ultrasound guidance 06-Mar-2024 10:35:37  Irasema Naranjo

## 2024-03-06 NOTE — ED PROVIDER NOTE - PROGRESS NOTE DETAILS
Danyell PGY2: OB/GYN made aware of patient. Agrees with septic work up. Recommends ertapenem instead of amp, gent, clinda. Antibiotics modified. Also recommend to hold off on imaging for now. Will send someone over to evaluate patient now

## 2024-03-06 NOTE — CHART NOTE - NSCHARTNOTEFT_GEN_A_CORE
R4 Blood Culture Review     Blood culture results reviewed. Cultures growing E Coli.     Will cont Invanz, f/u sensitivities. ID consult in AM.     Syeda Fox MD PGY4   d/w Dr. Amaral

## 2024-03-06 NOTE — ED ADULT NURSE NOTE - NSFALLUNIVINTERV_ED_ALL_ED
Bed/Stretcher in lowest position, wheels locked, appropriate side rails in place/Call bell, personal items and telephone in reach/Instruct patient to call for assistance before getting out of bed/chair/stretcher/Non-slip footwear applied when patient is off stretcher/[facility] to call system/Physically safe environment - no spills, clutter or unnecessary equipment/Purposeful proactive rounding/Room/bathroom lighting operational, light cord in reach

## 2024-03-06 NOTE — DISCHARGE NOTE PROVIDER - NSDCFUSCHEDAPPT_GEN_ALL_CORE_FT
Yung Amaral  [facility]  [facility] [address]  Scheduled Appointment: 03/22/2024     [facility]  [place] [address]  Scheduled Appointment: 06/10/2024

## 2024-03-06 NOTE — ED PROVIDER NOTE - CLINICAL SUMMARY MEDICAL DECISION MAKING FREE TEXT BOX
24-year-old  female, recent miscarriage  at 18 weeks gestation, presents with low hemoglobin levels. Febrile, tachycardic, and hypotensive on initial vitals. Physical exam significant for soft, tender in bilateral lower quadrants L>R, ND, normal external female genitalia, copious yellow discharge. Concern for septic /endometritis, will obtain sepsis labs, give antibiotics, antipyretics, consult OB/GYN, reassess

## 2024-03-06 NOTE — ED PROVIDER NOTE - ATTENDING CONTRIBUTION TO CARE
23 y/o F  s/p sAB at 18 weeks on , scheduled for D&C later today for rPOC.  She was found to be anemic to Hb 6 yesterday during pre-surgical testing and told to come to ED for transfusion prior to her procedure.  She denies any sxs.  No HA, lightheadedness, dizziness, LOC, cp, sob, palp, abd pain, dysuria, diarrhea.  Noted to be febrile to 102F in triage - does not report fevers at home.  Does report change in vaginal discharge/bleeding since last week.    Well appearing, lying comfortably in stretcher, awake and alert, nontoxic.  Febrile, tachycardic, borderline HOTN, VSS.  Lungs cta bl.  Cards nl S1/S2, tachy reg, no MRG.  Abd soft (+)suprapubic tenderness, no rebound or guarding.  No focal neuro deficits.    Pt meets SIRS criteria for sepsis, with concern for endometritis given known rpoc.  Will obtain labs, including cultures, ua, cxr, broad spectrum abx, transfuse as needed, GYN consulted.
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
## 2024-03-06 NOTE — ED PROVIDER NOTE - OBJECTIVE STATEMENT
24-year-old  female, recent miscarriage  at 18 weeks gestation, presents with low hemoglobin levels. Patient reports known retained products of conception and is scheduled for D&C this afternoon.  Found to have hemoglobin of 6.6 on presurgical labs.  Sent in by OB/GYN Dr. Amaral for blood transfusion.  Patient reported vaginal bleeding since onset of miscarriage, as well as lower abdominal cramping.  Also reporting headache currently.  Denies any nausea or vomiting, recent fevers.

## 2024-03-06 NOTE — PATIENT PROFILE ADULT - FALL HARM RISK - RISK INTERVENTIONS
Assistance OOB with selected safe patient handling equipment/Assistance with ambulation/Communicate Fall Risk and Risk Factors to all staff, patient, and family/Monitor gait and stability/Reinforce activity limits and safety measures with patient and family/Sit up slowly, dangle for a short time, stand at bedside before walking/Use of alarms - bed, chair and/or voice tab/Visual Cue: Yellow wristband/Bed in lowest position, wheels locked, appropriate side rails in place/Call bell, personal items and telephone in reach/Instruct patient to call for assistance before getting out of bed or chair/Non-slip footwear when patient is out of bed/[place] to call system/Physically safe environment - no spills, clutter or unnecessary equipment/Purposeful Proactive Rounding/Room/bathroom lighting operational, light cord in reach

## 2024-03-06 NOTE — DISCHARGE NOTE PROVIDER - NSDCMRMEDTOKEN_GEN_ALL_CORE_FT
acetaminophen 325 mg oral tablet: 2 tab(s) orally every 6 hours  ferrous sulfate 325 mg (65 mg elemental iron) oral tablet: 1 tab(s) orally once a day  ketorolac 10 mg oral tablet: 1 tab(s) orally 3 times a day as needed for prn  vitamin c 500mg once daily:    acetaminophen 325 mg oral tablet: 2 tab(s) orally every 6 hours  ciprofloxacin 500 mg oral tablet: 1 tab(s) orally every 12 hours Take twice daily until 3/14. MDD: 2  ferrous sulfate 325 mg (65 mg elemental iron) oral tablet: 1 tab(s) orally once a day  ibuprofen 600 mg oral tablet: 1 tab(s) orally every 6 hours  vitamin c 500mg once daily:

## 2024-03-06 NOTE — BRIEF OPERATIVE NOTE - VENOUS THROMBOEMBOLISM PROPHYLAXIS THERAPY
SCD's Niacinamide Pregnancy And Lactation Text: These medications are considered safe during pregnancy.

## 2024-03-06 NOTE — H&P ADULT - ATTENDING COMMENTS
Pt seen and evaluated at bedside  Agree with above  Pt for emergent D&C for septic AB/retained placenta  Invanz and 1u pRBCs given in ED  Consents signed and witnessed at bedside     champ WRIGHT Pt seen and evaluated at bedside  Agree with above  Pt for emergent D&C for septic AB/retained placenta  Invanz and 1u pRBCs given in ED  Consents signed and witnessed at bedside     SICU made aware of pt due to sepsis and risk of hemorrhage    champ WRIGHT

## 2024-03-06 NOTE — DISCHARGE NOTE PROVIDER - NSDCCPTREATMENT_GEN_ALL_CORE_FT
PRINCIPAL PROCEDURE  Procedure: Dilation and curettage of uterus using suction with ultrasound guidance  Findings and Treatment:

## 2024-03-06 NOTE — DISCHARGE NOTE PROVIDER - CARE PROVIDER_API CALL
Yung Amaral  Obstetrics and Gynecology  [address], [address]  [place] [address]  Phone: [phone]  Fax: [phone]  Follow Up Time: 2 weeks

## 2024-03-07 ENCOUNTER — TRANSCRIPTION ENCOUNTER (OUTPATIENT)
Age: 24
End: 2024-03-07

## 2024-03-07 LAB
BASOPHILS # BLD AUTO: 0.04 K/UL — SIGNIFICANT CHANGE UP (ref 0–0.2)
BASOPHILS NFR BLD AUTO: 0.2 % — SIGNIFICANT CHANGE UP (ref 0–2)
EOSINOPHIL # BLD AUTO: 0.01 K/UL — SIGNIFICANT CHANGE UP (ref 0–0.5)
EOSINOPHIL NFR BLD AUTO: 0.1 % — SIGNIFICANT CHANGE UP (ref 0–6)
GRAM STN FLD: ABNORMAL
HCT VFR BLD CALC: 26.3 % — LOW (ref 34.5–45)
HGB BLD-MCNC: 8.9 G/DL — LOW (ref 11.5–15.5)
IANC: 15.14 K/UL — HIGH (ref 1.8–7.4)
IMM GRANULOCYTES NFR BLD AUTO: 2.2 % — HIGH (ref 0–0.9)
LYMPHOCYTES # BLD AUTO: 1.22 K/UL — SIGNIFICANT CHANGE UP (ref 1–3.3)
LYMPHOCYTES # BLD AUTO: 6.8 % — LOW (ref 13–44)
MCHC RBC-ENTMCNC: 28.1 PG — SIGNIFICANT CHANGE UP (ref 27–34)
MCHC RBC-ENTMCNC: 33.8 GM/DL — SIGNIFICANT CHANGE UP (ref 32–36)
MCV RBC AUTO: 83 FL — SIGNIFICANT CHANGE UP (ref 80–100)
MONOCYTES # BLD AUTO: 1.1 K/UL — HIGH (ref 0–0.9)
MONOCYTES NFR BLD AUTO: 6.1 % — SIGNIFICANT CHANGE UP (ref 2–14)
NEUTROPHILS # BLD AUTO: 15.14 K/UL — HIGH (ref 1.8–7.4)
NEUTROPHILS NFR BLD AUTO: 84.6 % — HIGH (ref 43–77)
NRBC # BLD: 0 /100 WBCS — SIGNIFICANT CHANGE UP (ref 0–0)
NRBC # FLD: 0.08 K/UL — HIGH (ref 0–0)
PLATELET # BLD AUTO: 368 K/UL — SIGNIFICANT CHANGE UP (ref 150–400)
RBC # BLD: 3.17 M/UL — LOW (ref 3.8–5.2)
RBC # FLD: 14.5 % — SIGNIFICANT CHANGE UP (ref 10.3–14.5)
SPECIMEN SOURCE: SIGNIFICANT CHANGE UP
WBC # BLD: 17.9 K/UL — HIGH (ref 3.8–10.5)
WBC # FLD AUTO: 17.9 K/UL — HIGH (ref 3.8–10.5)

## 2024-03-07 PROCEDURE — 99254 IP/OBS CNSLTJ NEW/EST MOD 60: CPT | Mod: GC

## 2024-03-07 RX ORDER — CEFTRIAXONE 500 MG/1
1000 INJECTION, POWDER, FOR SOLUTION INTRAMUSCULAR; INTRAVENOUS EVERY 24 HOURS
Refills: 0 | Status: DISCONTINUED | OUTPATIENT
Start: 2024-03-08 | End: 2024-03-09

## 2024-03-07 RX ADMIN — Medication 600 MILLIGRAM(S): at 13:45

## 2024-03-07 RX ADMIN — Medication 600 MILLIGRAM(S): at 20:03

## 2024-03-07 RX ADMIN — Medication 600 MILLIGRAM(S): at 06:01

## 2024-03-07 RX ADMIN — ERTAPENEM SODIUM 120 MILLIGRAM(S): 1 INJECTION, POWDER, LYOPHILIZED, FOR SOLUTION INTRAMUSCULAR; INTRAVENOUS at 11:28

## 2024-03-07 RX ADMIN — HEPARIN SODIUM 5000 UNIT(S): 5000 INJECTION INTRAVENOUS; SUBCUTANEOUS at 18:27

## 2024-03-07 RX ADMIN — Medication 975 MILLIGRAM(S): at 21:31

## 2024-03-07 RX ADMIN — Medication 600 MILLIGRAM(S): at 14:45

## 2024-03-07 RX ADMIN — Medication 600 MILLIGRAM(S): at 07:00

## 2024-03-07 RX ADMIN — HEPARIN SODIUM 5000 UNIT(S): 5000 INJECTION INTRAVENOUS; SUBCUTANEOUS at 06:03

## 2024-03-07 RX ADMIN — Medication 600 MILLIGRAM(S): at 21:03

## 2024-03-07 RX ADMIN — Medication 975 MILLIGRAM(S): at 22:31

## 2024-03-07 NOTE — CONSULT NOTE ADULT - ASSESSMENT
Impression/Hospital Course:  25yo  s/p recent delivery (outside of hospital/birthing center) of pregnancy at 18 weeks on 2024 managed at outside hospital. Reports having had a significant blood loss at the time but states no other interventions aside from monitoring were done at the outside hospital. Since that time the patient has been experiencing include headache, cramping, lower abdominal pain myalgias, and back pain. Upon presentation patient was found to be febrile to Tmax of 102.2 with tachycardia and borderline lower BPs. Labs showed WBC of 19.97 which is now 17.9, H/H initially 6.6/20.0 now 8.9/26.3 s/p 5 units of PRBCs, betaHCG 4226 and UA not indicative of infection. CXR was performed showing clear lungs. Patient was taken to the OR on 3/6 and was found to have retained placenta and underwent D&C. Now blood cultures from 3/6 are growing 2/4 bottles E.coli without resistance genes on PCR.    Antimicrobials:  Ertapenem 3/6    Assessment:  *E.coli bacteremia in 2/4 bottles from blood cultures taken on 3/6, likely secondary to retained products of conception s/p I&D 3/6   *Sepsis as evidenced by pyrexia, leukocytosis and tachycardia secondary to above  *Delivery at 18 weeks on 24    Recommendations: PLEASE DEFER ALL CHANGES IN PLAN UNTIL SIGNED BY ATTENDING. All recommendations are tentative pending Attending Attestation.  - d/c Ertapenem as PCR shows no resistance genes  - start ampicillin/sulbactam to cover E.coli species but also provide anaerobic coverage considering retrained products of conception   - repeat blood cultures x2 to assure clearance  - retained products of conception management per primary team   - trend fever and WBC curve to monitor the progression of infection     Jd Cook DO, PGY-4   Infectious Disease Fellow  [employer] Teams Preferred  After 5pm/weekends call [phone]  Impression/Hospital Course:  25yo  s/p recent delivery of pregnancy at 18 week (miscarriage)on 2024 managed at outside hospital. Reports having had a significant blood loss at the time but states no other interventions aside from monitoring were done at the outside hospital. Since that time the patient has been experiencing include headache, cramping, lower abdominal pain myalgias, and back pain. Upon presentation patient was found to be febrile to Tmax of 102.2 with tachycardia and borderline lower BPs. Labs showed WBC of 19.97 which is now 17.9, H/H initially 6.6/20.0 now 8.9/26.3 s/p 5 units of PRBCs, betaHCG 4226 and UA not indicative of infection. CXR was performed showing clear lungs. Patient was taken to the OR on 3/6 and was found to have retained placenta and underwent D&C. Now blood cultures from 3/6 are growing 2/4 bottles E.coli without resistance genes on PCR.    Antimicrobials:  Ertapenem 3/6    Assessment:  *E.coli bacteremia in 2/4 bottles from blood cultures taken on 3/6, likely secondary to retained products of conception s/p I&D 3/6   *Sepsis as evidenced by pyrexia, leukocytosis and tachycardia secondary to above  *Delivery at 18 weeks on 24    Recommendations: PLEASE DEFER ALL CHANGES IN PLAN UNTIL SIGNED BY ATTENDING. All recommendations are tentative pending Attending Attestation.  - d/c Ertapenem as PCR shows no resistance genes  - start ampicillin/sulbactam to cover E.coli species but also provide anaerobic coverage considering retrained products of conception   - repeat blood cultures x2 to assure clearance  - retained products of conception management per primary team   - trend fever and WBC curve to monitor the progression of infection     Jd Cook DO, PGY-4   Infectious Disease Fellow  [employer] Teams Preferred  After 5pm/weekends call [phone]  Impression/Hospital Course:  23yo  s/p recent delivery of pregnancy at 18 week (miscarriage)on 2024 managed at outside hospital. Reports having had a significant blood loss at the time but states no other interventions aside from monitoring were done at the outside hospital. Since that time the patient has been experiencing include headache, cramping, lower abdominal pain myalgias, and back pain. Upon presentation patient was found to be febrile to Tmax of 102.2 with tachycardia and borderline lower BPs. Labs showed WBC of 19.97 which is now 17.9, H/H initially 6.6/20.0 now 8.9/26.3 s/p 5 units of PRBCs, betaHCG 4226 and UA not indicative of infection. CXR was performed showing clear lungs. Patient was taken to the OR on 3/6 and was found to have retained placenta and underwent D&C. Now blood cultures from 3/6 are growing 2/4 bottles E.coli without resistance genes on PCR.    Antimicrobials:  Ertapenem 3/6    Assessment:  *E.coli bacteremia in 2/4 bottles from blood cultures taken on 3/6, likely secondary to retained products of conception s/p I&D 3/6   *Sepsis as evidenced by pyrexia, leukocytosis and tachycardia secondary to above  *Delivery at 18 weeks on 24    Recommendations:   - d/c Ertapenem as PCR shows no resistance genes  - start Ceftriaxone 1g IV QD to cover E.coli, if cultures show sensitivity to ampicillin/sulbactam may switch to that instead of ceftriaxone to allow for transition to amoxicillin/clavulanate on discharge  - repeat blood cultures x2 to assure clearance  - retained products of conception management per primary team   - trend fever and WBC curve to monitor the progression of infection     Jd Cook DO, PGY-4   Infectious Disease Fellow  [facility] Teams Preferred  After 5pm/weekends call [phone]

## 2024-03-07 NOTE — PROGRESS NOTE ADULT - SUBJECTIVE AND OBJECTIVE BOX
Gyn Progress Note POD#1  HD#2    Subjective:   Pt seen and examined at bedside. No events overnight. Patient reports light vaginal bleeding overnight with 3 pad changes, less than half saturated. Pain well controlled. Patient ambulating, passing flatus. Tolerating regular diet. Pt denies fever, chills, chest pain, SOB, nausea, vomiting, lightheadedness, dizziness.      Objective:  T(F): 97.3 (24 @ 05:54), Max: 98.3 (24 @ 07:15)  HR: 64 (24 @ 05:54) (64 - 104)  BP: 99/59 (24 @ 05:54) (92/49 - 112/79)  RR: 17 (24 @ 05:54) (13 - 20)  SpO2: 99% (24 @ 05:54) (95% - 100%)  Wt(kg): --  I&O's Summary    06 Mar 2024 07:01  -  07 Mar 2024 06:25  --------------------------------------------------------  IN: 2500 mL / OUT: 2100 mL / NET: 400 mL      CAPILLARY BLOOD GLUCOSE          MEDICATIONS  (STANDING):  acetaminophen     Tablet .. 975 milliGRAM(s) Oral every 6 hours  ertapenem  IVPB 1000 milliGRAM(s) IV Intermittent every 24 hours  heparin   Injectable 5000 Unit(s) SubCutaneous every 12 hours  ibuprofen  Tablet. 600 milliGRAM(s) Oral every 6 hours  influenza   Vaccine 0.5 milliLiter(s) IntraMuscular once  lactated ringers. 1000 milliLiter(s) (100 mL/Hr) IV Continuous <Continuous>    MEDICATIONS  (PRN):      Physical Exam:  Constitutional: NAD, Lying in bed   CV: Regular rate and rhythm. No murmurs appreciated   Lungs: Clear to auscultation  bilaterally. No respiratory distress  Abdomen: Soft. Non-tender. Non-distended. Bowel sounds present  : Voiding spontaneously. VB wnl.   Extremities: No calf tenderness bilaterally    LABS:      131<L>  |  100    |  8      ----------------------------<  96     4.2     |  20<L>  |  0.34<L>    Ca    8.4        06 Mar 2024 06:17    TPro  6.9    /  Alb  3.1<L>  /  TBili  <0.2   /  DBili  x      /  AST  27     /  ALT  13     /  AlkPhos  80     03-06        PT/INR - ( 06 Mar 2024 06:17 )   PT: 12.0 sec;   INR: 1.06 ratio         PTT - ( 06 Mar 2024 06:17 )  PTT:28.7 sec  Urinalysis Basic - ( 06 Mar 2024 08:14 )    Color: Yellow / Appearance: Clear / S.013 / pH: x  Gluc: x / Ketone: Negative mg/dL  / Bili: Negative / Urobili: 0.2 mg/dL   Blood: x / Protein: Negative mg/dL / Nitrite: Negative   Leuk Esterase: Trace / RBC: 2 /HPF / WBC 3 /HPF   Sq Epi: x / Non Sq Epi: 3 /HPF / Bacteria: Negative /HPF

## 2024-03-07 NOTE — CONSULT NOTE ADULT - ATTENDING COMMENTS
23yo  s/p recent delivery of pregnancy at 18 week (miscarriage)on 2024 managed at outside hospital. Reports having had a significant blood loss at the time but states no other interventions aside from monitoring were done at the outside hospital. Since that time the patient has been experiencing include headache, cramping, lower abdominal pain myalgias, and back pain. Upon presentation patient was found to be febrile to Tmax of 102.2 with tachycardia and borderline lower BPs. Labs showed WBC of 19.97 which is now 17.9, H/H initially 6.6/20.0 now 8.9/26.3 s/p 5 units of PRBCs, betaHCG 4226 and UA not indicative of infection. CXR was performed showing clear lungs. Patient was taken to the OR on 3/6 and was found to have retained placenta and underwent D&C. Now blood cultures from 3/6 are growing 2/4 bottles E.coli without resistance genes on PCR.    Antimicrobials:  Ertapenem 3/6    Assessment:  *E.coli bacteremia in 2/4 bottles from blood cultures taken on 3/6, likely secondary to retained products of conception s/p I&D 3/6   *Sepsis as evidenced by fever, leukocytosis and tachycardia secondary to above  *Delivery at 18 weeks on 24  * positive culture finding with placental cx with E coli.       Recommendations:   - d/c Ertapenem as PCR shows no resistance genes  - start Ceftriaxone 1g IV QD to cover E.coli,   - repeat blood cultures x2 to assure clearance  - follow up OR cx   - trend WBC, + leucocytosis     Plan discussed with consulting team.     Candi Knapp  Please contact through MS Teams   If no response or past 5 pm/weekend call [phone].

## 2024-03-07 NOTE — PROGRESS NOTE ADULT - ASSESSMENT
Assessment/Plan: 24y POD#1, s/p DVC for retained placenta after extramural delivery of TIUP on 2/25. Patient feels well and is recovering appropriately.     Neuro: Acetaminophen, Toradol, and Oxycodone PRN  CV: Hemodynamically stable  Pulm: Saturating well on RA. Increase incentive spirometry.  GI: Regular diet   : Voiding Spontaneously  Heme: HSQ for DVT ppx  - Hct: 20-> 2U PRBC -> 26.9  - s/p Preoperative and intraoperative repletion, QBL 100mL.   FEN: LR@100, replete electrolytes PRN  ID: Afebrile  - WBC: 16.7->19.3  - Blood Cultures (3/6): E. Coli  - CXR (3/6): clear lungs  - Continue Invanz (3/6- ), for another dose this morning, follow culture antibiotic susceptibilities  - F/u Urine and genital cultures   - Consult to ID this AM  Endo: No active issues   Dispo: Continue inpatient care and IV antibiotics this morning.     Antoinette Nova, PGY-1

## 2024-03-07 NOTE — CHART NOTE - NSCHARTNOTEFT_GEN_A_CORE
Patient evaluated at bedside due to call from RN that patient with abdominal pain.  Patient reports that pain started shortly after she ate dinner.  States pain is sharp, began in upper abdomen, and now radiates to L neck and shoulder.  States she is passing flatus, but typically passes more flatus after dinner than she has tonight.  Endorses mild lower abdominal cramping and headache.  Denies nausea, vomiting, chest pain, shortness of breath.      Vital Signs Last 24 Hrs  T(C): 36.7 (07 Mar 2024 17:35), Max: 36.7 (07 Mar 2024 14:30)  T(F): 98 (07 Mar 2024 17:35), Max: 98 (07 Mar 2024 14:30)  HR: 83 (07 Mar 2024 17:35) (64 - 83)  BP: 102/65 (07 Mar 2024 17:35) (96/62 - 102/65)  BP(mean): --  RR: 17 (07 Mar 2024 17:35) (16 - 17)  SpO2: 100% (07 Mar 2024 17:35) (99% - 100%)    Parameters below as of 07 Mar 2024 17:35  Patient On (Oxygen Delivery Method): room air      PHYSICAL EXAM:   Gen: NAD, alert and oriented x 3  Respiratory: breathing comfortably on RA  Abd: soft, moderately distended and tympanic, +BS, no rebound/guarding, mildly tender to palpation in lower abdomen  Extremities: non-tender b/l, no edema   Skin: warm and well perfused    A/P: 24y POD#1, s/p DVC for retained placenta after extramural delivery of TIUP on 2/25.    INCOMPLETE NOTE Patient evaluated at bedside due to call from RN that patient with abdominal pain.  Patient reports that pain started shortly after she ate dinner.  States pain is sharp, began in upper abdomen, and now radiates to L neck and shoulder.  States she is passing flatus, but typically passes more flatus after dinner than she has tonight.  Endorses mild lower abdominal cramping and headache.  Denies nausea, vomiting, chest pain, shortness of breath.      Vital Signs Last 24 Hrs  T(C): 36.7 (07 Mar 2024 17:35), Max: 36.7 (07 Mar 2024 14:30)  T(F): 98 (07 Mar 2024 17:35), Max: 98 (07 Mar 2024 14:30)  HR: 83 (07 Mar 2024 17:35) (64 - 83)  BP: 102/65 (07 Mar 2024 17:35) (96/62 - 102/65)  BP(mean): --  RR: 17 (07 Mar 2024 17:35) (16 - 17)  SpO2: 100% (07 Mar 2024 17:35) (99% - 100%)    Parameters below as of 07 Mar 2024 17:35  Patient On (Oxygen Delivery Method): room air      PHYSICAL EXAM:   Gen: NAD, alert and oriented x 3  Respiratory: breathing comfortably on RA  Abd: soft, moderately distended and tympanic, +BS, no rebound/guarding, mildly tender to palpation in lower abdomen  Extremities: non-tender b/l, no edema   Skin: warm and well perfused    A/P: 24y POD#1, s/p DVC for retained placenta after extramural delivery of TIUP on 2/25, now with complaint of abdominal pain, likely gas pain  - For Simethicone  - Encouraged ambulation, warm liquids  - Patient to receive Tylenol for headache  - Patient to alert RN if pain does not improve or worsens    Discussed with Dr. Ulysses Zamora PGY2

## 2024-03-07 NOTE — CONSULT NOTE ADULT - SUBJECTIVE AND OBJECTIVE BOX
Patient is a 24y old  Female who presents with a chief complaint of Septic ab/retained placenta (06 Mar 2024 12:39)    HPI: 23yo  s/p recent delivery (outside of hospital/birthing center) of pregnancy at 18 weeks on 2024 managed at outside hospital. Reports having had a significant blood loss at the time but states no other interventions aside from monitoring were done at the outside hospital. Since that time the patient has been experiencing include headache, cramping, lower abdominal pain myalgias, and back pain. Upon presentation patient was found to be febrile to Tmax of 102.2 with tachycardia and borderline lower BPs. Labs showed WBC of 19.97 which is now 17.9, H/H initially 6.6/20.0 now 8.9/26.3 s/p 5 units of PRBCs, betaHCG 4226 and UA not indicative of infection. CXR was performed showing clear lungs. Patient was taken to the OR on 3/6 and was found to have retained placenta and underwent D&C. Now blood cultures from 3/6 are growing 2/4 bottles E.coli without resistance genes on PCR.    REVIEW OF SYSTEMS  pending full examination      prior hospital charts reviewed [V]  primary team notes reviewed [V]  other consultant notes reviewed [V]    PAST MEDICAL & SURGICAL HISTORY:  Retained portions of placenta without hemorrhage      Miscarriage      No significant past surgical history          SOCIAL HISTORY:  - Denied smoking/vaping/alcohol/recreational drug use    FAMILY HISTORY:      Allergies  No Known Allergies        ANTIMICROBIALS:  ertapenem  IVPB 1000 every 24 hours      ANTIMICROBIALS (past 90 days):  MEDICATIONS  (STANDING):    ertapenem  IVPB   120 mL/Hr IV Intermittent (24 @ 06:33)    ertapenem  IVPB   120 mL/Hr IV Intermittent (24 @ 11:29)        OTHER MEDS:   MEDICATIONS  (STANDING):  acetaminophen     Tablet .. 975 every 6 hours  heparin   Injectable 5000 every 12 hours  ibuprofen  Tablet. 600 every 6 hours  influenza   Vaccine 0.5 once      VITALS:  Vital Signs Last 24 Hrs  T(F): 97.3 (24 @ 05:54), Max: 102.2 (24 @ 05:08)    Vital Signs Last 24 Hrs  HR: 64 (24 @ 05:54) (64 - 96)  BP: 99/59 (24 @ 05:54) (93/52 - 112/79)  RR: 17 (24 @ 05:54)  SpO2: 99% (24 @ 05:54) (95% - 100%)  Wt(kg): --    EXAM:  pending full examination      Labs:                        8.9    17.90 )-----------( 368      ( 07 Mar 2024 05:46 )             26.3         131<L>  |  100  |  8   ----------------------------<  96  4.2   |  20<L>  |  0.34<L>    Ca    8.4      06 Mar 2024 06:17    TPro  6.9  /  Alb  3.1<L>  /  TBili  <0.2  /  DBili  x   /  AST  27  /  ALT  13  /  AlkPhos  80        WBC Trend:  WBC Count: 17.90 (24 @ 05:46)  WBC Count: 19.39 (24 @ 12:45)  WBC Count: 16.70 (24 @ 06:17)  WBC Count: 19.97 (24 @ 13:20)      Auto Neutrophil #: 15.14 K/uL (24 @ 05:46)  Auto Neutrophil #: 17.44 K/uL (24 @ 12:45)  Auto Neutrophil #: 13.48 K/uL (24 @ 06:17)  Band Neutrophils %: 2.6 % (24 @ 06:17)      Creatine Trend:  Creatinine: 0.34 ()    Liver Biochemical Testing Trend:  Alanine Aminotransferase (ALT/SGPT): 13 ()  Aspartate Aminotransferase (AST/SGOT): 27 (24 @ 06:17)  Bilirubin Total: <0.2 ()    Auto Eosinophil %: 0.1 % (24 @ 05:46)  Auto Eosinophil %: 0.1 % (24 @ 12:45)  Auto Eosinophil %: 0.0 % (24 @ 06:17)      Urinalysis Basic - ( 06 Mar 2024 08:14 )    Color: Yellow / Appearance: Clear / S.013 / pH: x  Gluc: x / Ketone: Negative mg/dL  / Bili: Negative / Urobili: 0.2 mg/dL   Blood: x / Protein: Negative mg/dL / Nitrite: Negative   Leuk Esterase: Trace / RBC: 2 /HPF / WBC 3 /HPF   Sq Epi: x / Non Sq Epi: 3 /HPF / Bacteria: Negative /HPF    MICROBIOLOGY:  Culture - Fungal, Genital (collected 06 Mar 2024 09:15)  Source: .Genital vaginal discharge  Preliminary Report:    Testing in progress    Culture - Blood (collected 06 Mar 2024 06:15)  Source: .Blood Blood-Peripheral  Preliminary Report:    Growth in anaerobic bottle: Gram Negative Rods    Culture - Blood (collected 06 Mar 2024 06:11)  Source: .Blood Blood-Peripheral  Preliminary Report:    Growth in aerobic bottle: Gram Negative Rods    Direct identification is available within approximately 3-5    hours either by Blood Panel Multiplexed PCR or Direct    MALDI-TOF. Details: [email or web address]  Organism: Blood Culture PCR  Organism: Blood Culture PCR    Sensitivities:      Method Type: PCR      -  Escherichia coli: Detec      Blood Gas Venous - Lactate: 1.3 ( @ 06:05)    RADIOLOGY:  < from: Xray Chest 1 View- PORTABLE-Urgent (Xray Chest 1 View- PORTABLE-Urgent .) (24 @ 07:00) >  IMPRESSION:  Clear lungs.       Patient is a 24y old  Female who presents with a chief complaint of Septic ab/retained placenta (06 Mar 2024 12:39)    HPI: 25yo  s/p recent delivery of pregnancy at 18 weeks (miscarriage) on 2024 managed at outside hospital. Reports having had a significant blood loss at the time but states no other interventions aside from monitoring were done at the outside hospital. Since that time the patient has been experiencing include headache, cramping, lower abdominal pain myalgias, and back pain. Upon presentation patient was found to be febrile to Tmax of 102.2 with tachycardia and borderline lower BPs. Labs showed WBC of 19.97 which is now 17.9, H/H initially 6.6/20.0 now 8.9/26.3 s/p 5 units of PRBCs, betaHCG 4226 and UA not indicative of infection. CXR was performed showing clear lungs. Patient was taken to the OR on 3/6 and was found to have retained placenta and underwent D&C. Now blood cultures from 3/6 are growing 2/4 bottles E.coli without resistance genes on PCR. Currently patient states she feels much better and the persistent pain and chills she was having for almost 2 weeks are almost totally gone    REVIEW OF SYSTEMS  Constitutional: No fevers, No chills, No weight loss, No fatigue   Skin: No rash, no phlebitis	  Eyes: No discharge, No change in vision	  ENMT: No sore throat, No ulcers  Respiratory: No cough, no SOB  Cardiovascular:  No chest pain, No palpitations   Gastrointestinal: positive pain, No nausea, No vomiting, No diarrhea, No constipation	  Genitourinary: No dysuria, No frequency, No hesitancy, No flank pain  MSK: No Joint pain, No back pain, No edema  Neurological: No HA, no weakness, no seizures, no AMS       prior hospital charts reviewed [V]  primary team notes reviewed [V]  other consultant notes reviewed [V]    PAST MEDICAL & SURGICAL HISTORY:  Retained portions of placenta without hemorrhage      Miscarriage      No significant past surgical history          SOCIAL HISTORY:  - Denied smoking/vaping/alcohol/recreational drug use, lives with , works as , last travel 2023 to [facility] born in , has 2 dogs     FAMILY HISTORY:      Allergies  No Known Allergies        ANTIMICROBIALS:  ertapenem  IVPB 1000 every 24 hours      ANTIMICROBIALS (past 90 days):  MEDICATIONS  (STANDING):    ertapenem  IVPB   120 mL/Hr IV Intermittent (24 @ 06:33)    ertapenem  IVPB   120 mL/Hr IV Intermittent (24 @ 11:29)        OTHER MEDS:   MEDICATIONS  (STANDING):  acetaminophen     Tablet .. 975 every 6 hours  heparin   Injectable 5000 every 12 hours  ibuprofen  Tablet. 600 every 6 hours  influenza   Vaccine 0.5 once      VITALS:  Vital Signs Last 24 Hrs  T(F): 97.3 (24 @ 05:54), Max: 102.2 (24 @ 05:08)    Vital Signs Last 24 Hrs  HR: 64 (24 @ 05:54) (64 - 96)  BP: 99/59 (24 @ 05:54) (93/52 - 112/79)  RR: 17 (24 @ 05:54)  SpO2: 99% (24 @ 05:54) (95% - 100%)  Wt(kg): --    EXAM:  General: Patient appears comfortable, no acute distress  HEENT: NCAT, PERRL, anicteric sclera, mucous membranes moist and intact  Neck: Supple, No lymphadenopathy  CV: +S1/S2, RRR, no M/R/G  Lungs: No respiratory distress, CTA b/l, no wheezing, rales or rhonchi  Abd:  BS4+, Soft, slight pain to palpation of the lower abdomen, no guarding  : No suprapubic tenderness  Neuro: AAOx3. No focal deficits noted.   Ext: No cyanosis, no edema, 2+ pulses in upper and lower extremities   Msk: freely moving upper and lower extremities  Skin: No rash, no phlebitis, No erythema       Labs:                        8.9    17.90 )-----------( 368      ( 07 Mar 2024 05:46 )             26.3     03-06    131<L>  |  100  |  8   ----------------------------<  96  4.2   |  20<L>  |  0.34<L>    Ca    8.4      06 Mar 2024 06:17    TPro  6.9  /  Alb  3.1<L>  /  TBili  <0.2  /  DBili  x   /  AST  27  /  ALT  13  /  AlkPhos  80  03-      WBC Trend:  WBC Count: 17.90 (24 @ 05:46)  WBC Count: 19.39 (24 @ 12:45)  WBC Count: 16.70 (24 @ 06:17)  WBC Count: 19.97 (24 @ 13:20)      Auto Neutrophil #: 15.14 K/uL (24 @ 05:46)  Auto Neutrophil #: 17.44 K/uL (24 @ 12:45)  Auto Neutrophil #: 13.48 K/uL (24 @ 06:17)  Band Neutrophils %: 2.6 % (24 @ 06:17)      Creatine Trend:  Creatinine: 0.34 ()    Liver Biochemical Testing Trend:  Alanine Aminotransferase (ALT/SGPT): 13 ()  Aspartate Aminotransferase (AST/SGOT): 27 (24 @ 06:17)  Bilirubin Total: <0.2 ()    Auto Eosinophil %: 0.1 % (24 @ 05:46)  Auto Eosinophil %: 0.1 % (24 @ 12:45)  Auto Eosinophil %: 0.0 % (24 @ 06:17)      Urinalysis Basic - ( 06 Mar 2024 08:14 )    Color: Yellow / Appearance: Clear / S.013 / pH: x  Gluc: x / Ketone: Negative mg/dL  / Bili: Negative / Urobili: 0.2 mg/dL   Blood: x / Protein: Negative mg/dL / Nitrite: Negative   Leuk Esterase: Trace / RBC: 2 /HPF / WBC 3 /HPF   Sq Epi: x / Non Sq Epi: 3 /HPF / Bacteria: Negative /HPF    MICROBIOLOGY:  Culture - Fungal, Genital (collected 06 Mar 2024 09:15)  Source: .Genital vaginal discharge  Preliminary Report:    Testing in progress    Culture - Blood (collected 06 Mar 2024 06:15)  Source: .Blood Blood-Peripheral  Preliminary Report:    Growth in anaerobic bottle: Gram Negative Rods    Culture - Blood (collected 06 Mar 2024 06:11)  Source: .Blood Blood-Peripheral  Preliminary Report:    Growth in aerobic bottle: Gram Negative Rods    Direct identification is available within approximately 3-5    hours either by Blood Panel Multiplexed PCR or Direct    MALDI-TOF. Details: [email or web address]  Organism: Blood Culture PCR  Organism: Blood Culture PCR    Sensitivities:      Method Type: PCR      -  Escherichia coli: Detec      Blood Gas Venous - Lactate: 1.3 ( @ 06:05)    RADIOLOGY:  < from: Xray Chest 1 View- PORTABLE-Urgent (Xray Chest 1 View- PORTABLE-Urgent .) (24 @ 07:00) >  IMPRESSION:  Clear lungs.       Patient is a 24y old  Female who presents with a chief complaint of Septic ab/retained placenta (06 Mar 2024 12:39)    HPI: 23yo  s/p recent delivery of pregnancy at 18 weeks (miscarriage) on 2024 managed at outside hospital. Reports having had a significant blood loss at the time but states no other interventions aside from monitoring were done at the outside hospital. Since that time the patient has been experiencing include headache, cramping, lower abdominal pain myalgias, and back pain. Upon presentation patient was found to be febrile to Tmax of 102.2 with tachycardia and borderline lower BPs. Labs showed WBC of 19.97 which is now 17.9, H/H initially 6.6/20.0 now 8.9/26.3 s/p 5 units of PRBCs, betaHCG 4226 and UA not indicative of infection. CXR was performed showing clear lungs. Patient was taken to the OR on 3/6 and was found to have retained placenta and underwent D&C. Now blood cultures from 3/6 are growing 2/4 bottles E.coli without resistance genes on PCR. Currently patient states she feels much better and the persistent pain and chills she was having for almost 2 weeks are almost totally gone      REVIEW OF SYSTEMS  Constitutional: resolved fevers,   Skin: No rash, 	  Eyes: No discharge, No change in vision	  ENMT: No sore throat, No ulcers  Respiratory: No cough, no SOB  Cardiovascular:  No chest pain,   Gastrointestinal: positive pain, No nausea, No vomiting	  Genitourinary: No dysuria, No frequency,  MSK: No Joint pain,   Neurological: No HA, no AMS   Extremities: No swelling.       prior hospital charts reviewed [V]  primary team notes reviewed [V]  other consultant notes reviewed [V]      PAST MEDICAL & SURGICAL HISTORY:  Retained portions of placenta without hemorrhage      Miscarriage      No significant past surgical history          SOCIAL HISTORY:  - Denied smoking, lives with , works as [employer], last travel 2023 to [place] born in , has 2 dogs         FAMILY HISTORY:  Denies any family hx of DM in first degree relatives.         Allergies  No Known Allergies        ANTIMICROBIALS:  ertapenem  IVPB 1000 every 24 hours      ANTIMICROBIALS (past 90 days):  MEDICATIONS  (STANDING):    ertapenem  IVPB   120 mL/Hr IV Intermittent (24 @ 06:33)    ertapenem  IVPB   120 mL/Hr IV Intermittent (24 @ 11:29)        OTHER MEDS:   MEDICATIONS  (STANDING):  acetaminophen     Tablet .. 975 every 6 hours  heparin   Injectable 5000 every 12 hours  ibuprofen  Tablet. 600 every 6 hours  influenza   Vaccine 0.5 once      VITALS:  Vital Signs Last 24 Hrs  T(F): 97.3 (24 @ 05:54), Max: 102.2 (24 @ 05:08)    Vital Signs Last 24 Hrs  HR: 64 (24 @ 05:54) (64 - 96)  BP: 99/59 (24 @ 05:54) (93/52 - 112/79)  RR: 17 (24 @ 05:54)  SpO2: 99% (24 @ 05:54) (95% - 100%)  Wt(kg): --      EXAM:  General: Patient appears comfortable, no acute distress  Eyes: No discharge   ENT: mucous membranes moist and intact  Neck: Supple,   CV: +S1/S2, regular  Lungs: No respiratory distress, + air entry b/l   Abd: Soft, slight pain to palpation of the lower abdomen, no guarding  : No suprapubic tenderness  Neuro: AAOx3. No new focal deficits noted.   Ext: No edema,   Vascular: 2+ pulses in upper and lower extremities   Msk: No joint swelling   Skin: No rash,       Labs:                        8.9    17.90 )-----------( 368      ( 07 Mar 2024 05:46 )             26.3     03-    131<L>  |  100  |  8   ----------------------------<  96  4.2   |  20<L>  |  0.34<L>    Ca    8.4      06 Mar 2024 06:17    TPro  6.9  /  Alb  3.1<L>  /  TBili  <0.2  /  DBili  x   /  AST  27  /  ALT  13  /  AlkPhos  80        WBC Trend:  WBC Count: 17.90 (24 @ 05:46)  WBC Count: 19.39 (24 @ 12:45)  WBC Count: 16.70 (24 @ 06:17)  WBC Count: 19.97 (24 @ 13:20)      Auto Neutrophil #: 15.14 K/uL (24 @ 05:46)  Auto Neutrophil #: 17.44 K/uL (24 @ 12:45)  Auto Neutrophil #: 13.48 K/uL (24 @ 06:17)  Band Neutrophils %: 2.6 % (24 @ 06:17)      Creatine Trend:  Creatinine: 0.34 ()    Liver Biochemical Testing Trend:  Alanine Aminotransferase (ALT/SGPT): 13 ()  Aspartate Aminotransferase (AST/SGOT): 27 (24 @ 06:17)  Bilirubin Total: <0.2 ()    Auto Eosinophil %: 0.1 % (24 @ 05:46)  Auto Eosinophil %: 0.1 % (24 @ 12:45)  Auto Eosinophil %: 0.0 % (24 @ 06:17)      Urinalysis Basic - ( 06 Mar 2024 08:14 )    Color: Yellow / Appearance: Clear / S.013 / pH: x  Gluc: x / Ketone: Negative mg/dL  / Bili: Negative / Urobili: 0.2 mg/dL   Blood: x / Protein: Negative mg/dL / Nitrite: Negative   Leuk Esterase: Trace / RBC: 2 /HPF / WBC 3 /HPF   Sq Epi: x / Non Sq Epi: 3 /HPF / Bacteria: Negative /HPF    MICROBIOLOGY:  Culture - Fungal, Genital (collected 06 Mar 2024 09:15)  Source: .Genital vaginal discharge  Preliminary Report:    Testing in progress    Culture - Blood (collected 06 Mar 2024 06:15)  Source: .Blood Blood-Peripheral  Preliminary Report:    Growth in anaerobic bottle: Gram Negative Rods    Culture - Blood (collected 06 Mar 2024 06:11)  Source: .Blood Blood-Peripheral  Preliminary Report:    Growth in aerobic bottle: Gram Negative Rods    Direct identification is available within approximately 3-5    hours either by Blood Panel Multiplexed PCR or Direct    MALDI-TOF. Details: [email or web address]  Organism: Blood Culture PCR  Organism: Blood Culture PCR    Sensitivities:      Method Type: PCR      -  Escherichia coli: Detec      Blood Gas Venous - Lactate: 1.3 ( @ 06:05)    RADIOLOGY: Imaging reviewed personally and interpretation as mentioned below.     < from: Xray Chest 1 View- PORTABLE-Urgent (Xray Chest 1 View- PORTABLE-Urgent .) (24 @ 07:00) >  IMPRESSION:  Clear lungs.

## 2024-03-07 NOTE — PROVIDER CONTACT NOTE (CRITICAL VALUE NOTIFICATION) - TEST AND RESULT REPORTED:
3/6/24 blood culture aerobic bottle gram negative rods
blood culture 3/6/24 anerobic bottle with gram negative mukund

## 2024-03-07 NOTE — CHART NOTE - NSCHARTNOTEFT_GEN_A_CORE
ATT:  Pt seen by me this afternoon. She continues to feel better and has less bleeding than in the morning. All questions answered to pt's satisfaction. Trena Arora MD

## 2024-03-07 NOTE — PROVIDER CONTACT NOTE (CRITICAL VALUE NOTIFICATION) - SITUATION
3/6/24 blood culture aerobic bottle gram negative rods.
blood culture from 3/6/24 anerobic bottle with gram negative mukund

## 2024-03-08 LAB
-  AMOXICILLIN/CLAVULANIC ACID: SIGNIFICANT CHANGE UP
-  AMPICILLIN/SULBACTAM: SIGNIFICANT CHANGE UP
-  AMPICILLIN/SULBACTAM: SIGNIFICANT CHANGE UP
-  AMPICILLIN: SIGNIFICANT CHANGE UP
-  AMPICILLIN: SIGNIFICANT CHANGE UP
-  AZTREONAM: SIGNIFICANT CHANGE UP
-  AZTREONAM: SIGNIFICANT CHANGE UP
-  CEFAZOLIN: SIGNIFICANT CHANGE UP
-  CEFAZOLIN: SIGNIFICANT CHANGE UP
-  CEFEPIME: SIGNIFICANT CHANGE UP
-  CEFEPIME: SIGNIFICANT CHANGE UP
-  CEFOXITIN: SIGNIFICANT CHANGE UP
-  CEFOXITIN: SIGNIFICANT CHANGE UP
-  CEFTRIAXONE: SIGNIFICANT CHANGE UP
-  CEFTRIAXONE: SIGNIFICANT CHANGE UP
-  CIPROFLOXACIN: SIGNIFICANT CHANGE UP
-  CIPROFLOXACIN: SIGNIFICANT CHANGE UP
-  ERTAPENEM: SIGNIFICANT CHANGE UP
-  ERTAPENEM: SIGNIFICANT CHANGE UP
-  GENTAMICIN: SIGNIFICANT CHANGE UP
-  GENTAMICIN: SIGNIFICANT CHANGE UP
-  IMIPENEM: SIGNIFICANT CHANGE UP
-  IMIPENEM: SIGNIFICANT CHANGE UP
-  LEVOFLOXACIN: SIGNIFICANT CHANGE UP
-  LEVOFLOXACIN: SIGNIFICANT CHANGE UP
-  MEROPENEM: SIGNIFICANT CHANGE UP
-  MEROPENEM: SIGNIFICANT CHANGE UP
-  PIPERACILLIN/TAZOBACTAM: SIGNIFICANT CHANGE UP
-  PIPERACILLIN/TAZOBACTAM: SIGNIFICANT CHANGE UP
-  TOBRAMYCIN: SIGNIFICANT CHANGE UP
-  TOBRAMYCIN: SIGNIFICANT CHANGE UP
-  TRIMETHOPRIM/SULFAMETHOXAZOLE: SIGNIFICANT CHANGE UP
-  TRIMETHOPRIM/SULFAMETHOXAZOLE: SIGNIFICANT CHANGE UP
BASOPHILS # BLD AUTO: 0.03 K/UL — SIGNIFICANT CHANGE UP (ref 0–0.2)
BASOPHILS NFR BLD AUTO: 0.4 % — SIGNIFICANT CHANGE UP (ref 0–2)
CULTURE RESULTS: ABNORMAL
CULTURE RESULTS: ABNORMAL
EOSINOPHIL # BLD AUTO: 0.06 K/UL — SIGNIFICANT CHANGE UP (ref 0–0.5)
EOSINOPHIL NFR BLD AUTO: 0.7 % — SIGNIFICANT CHANGE UP (ref 0–6)
HCT VFR BLD CALC: 25.4 % — LOW (ref 34.5–45)
HGB BLD-MCNC: 8.4 G/DL — LOW (ref 11.5–15.5)
IANC: 5.03 K/UL — SIGNIFICANT CHANGE UP (ref 1.8–7.4)
IMM GRANULOCYTES NFR BLD AUTO: 2.2 % — HIGH (ref 0–0.9)
LYMPHOCYTES # BLD AUTO: 2.17 K/UL — SIGNIFICANT CHANGE UP (ref 1–3.3)
LYMPHOCYTES # BLD AUTO: 25.6 % — SIGNIFICANT CHANGE UP (ref 13–44)
MCHC RBC-ENTMCNC: 27.8 PG — SIGNIFICANT CHANGE UP (ref 27–34)
MCHC RBC-ENTMCNC: 33.1 GM/DL — SIGNIFICANT CHANGE UP (ref 32–36)
MCV RBC AUTO: 84.1 FL — SIGNIFICANT CHANGE UP (ref 80–100)
METHOD TYPE: SIGNIFICANT CHANGE UP
METHOD TYPE: SIGNIFICANT CHANGE UP
MONOCYTES # BLD AUTO: 1 K/UL — HIGH (ref 0–0.9)
MONOCYTES NFR BLD AUTO: 11.8 % — SIGNIFICANT CHANGE UP (ref 2–14)
NEUTROPHILS # BLD AUTO: 5.03 K/UL — SIGNIFICANT CHANGE UP (ref 1.8–7.4)
NEUTROPHILS NFR BLD AUTO: 59.3 % — SIGNIFICANT CHANGE UP (ref 43–77)
NRBC # BLD: 0 /100 WBCS — SIGNIFICANT CHANGE UP (ref 0–0)
NRBC # FLD: 0.07 K/UL — HIGH (ref 0–0)
ORGANISM # SPEC MICROSCOPIC CNT: ABNORMAL
PLATELET # BLD AUTO: 369 K/UL — SIGNIFICANT CHANGE UP (ref 150–400)
RBC # BLD: 3.02 M/UL — LOW (ref 3.8–5.2)
RBC # FLD: 14.6 % — HIGH (ref 10.3–14.5)
SPECIMEN SOURCE: SIGNIFICANT CHANGE UP
SPECIMEN SOURCE: SIGNIFICANT CHANGE UP
WBC # BLD: 8.48 K/UL — SIGNIFICANT CHANGE UP (ref 3.8–10.5)
WBC # FLD AUTO: 8.48 K/UL — SIGNIFICANT CHANGE UP (ref 3.8–10.5)

## 2024-03-08 PROCEDURE — 99232 SBSQ HOSP IP/OBS MODERATE 35: CPT

## 2024-03-08 RX ORDER — CIPROFLOXACIN LACTATE 400MG/40ML
1 VIAL (ML) INTRAVENOUS
Qty: 12 | Refills: 0
Start: 2024-03-08 | End: 2024-03-13

## 2024-03-08 RX ORDER — SIMETHICONE 80 MG/1
80 TABLET, CHEWABLE ORAL EVERY 6 HOURS
Refills: 0 | Status: DISCONTINUED | OUTPATIENT
Start: 2024-03-08 | End: 2024-03-09

## 2024-03-08 RX ORDER — IBUPROFEN 200 MG
1 TABLET ORAL
Qty: 0 | Refills: 0 | DISCHARGE
Start: 2024-03-08

## 2024-03-08 RX ADMIN — Medication 975 MILLIGRAM(S): at 09:16

## 2024-03-08 RX ADMIN — HEPARIN SODIUM 5000 UNIT(S): 5000 INJECTION INTRAVENOUS; SUBCUTANEOUS at 05:22

## 2024-03-08 RX ADMIN — Medication 600 MILLIGRAM(S): at 17:44

## 2024-03-08 RX ADMIN — Medication 600 MILLIGRAM(S): at 18:14

## 2024-03-08 RX ADMIN — Medication 975 MILLIGRAM(S): at 15:54

## 2024-03-08 RX ADMIN — SIMETHICONE 80 MILLIGRAM(S): 80 TABLET, CHEWABLE ORAL at 09:16

## 2024-03-08 RX ADMIN — Medication 600 MILLIGRAM(S): at 02:13

## 2024-03-08 RX ADMIN — HEPARIN SODIUM 5000 UNIT(S): 5000 INJECTION INTRAVENOUS; SUBCUTANEOUS at 17:43

## 2024-03-08 RX ADMIN — Medication 975 MILLIGRAM(S): at 02:56

## 2024-03-08 RX ADMIN — Medication 600 MILLIGRAM(S): at 13:14

## 2024-03-08 RX ADMIN — Medication 975 MILLIGRAM(S): at 03:56

## 2024-03-08 RX ADMIN — Medication 600 MILLIGRAM(S): at 12:14

## 2024-03-08 RX ADMIN — Medication 600 MILLIGRAM(S): at 03:13

## 2024-03-08 RX ADMIN — Medication 975 MILLIGRAM(S): at 10:16

## 2024-03-08 RX ADMIN — Medication 975 MILLIGRAM(S): at 16:54

## 2024-03-08 RX ADMIN — CEFTRIAXONE 100 MILLIGRAM(S): 500 INJECTION, POWDER, FOR SOLUTION INTRAMUSCULAR; INTRAVENOUS at 12:14

## 2024-03-08 NOTE — PROVIDER CONTACT NOTE (OTHER) - ASSESSMENT
pt. in no acute distress, awake, alert and oriented x4, reports upper abdomen  pain, sharp at times, comes and go , pt. reports passing flatus, abdomen soft , +BS ,

## 2024-03-08 NOTE — HISTORY OF PRESENT ILLNESS
[FreeTextEntry1] : 23y/o presents for followup Pt is s/p extramural delivery of TERRY CHACKO on 2/25. Pt reports she was feeling intermittent pelvic pressure and went for a walk around the city. She had sudden ROM followed by delivery of fetus A. EMS was called and pt was taken to [facility] where fetus B was delivered. Pt reports she did not see the placenta delivered, but that the doctors has removed remnants at bedside. Records reviewed and scanned in, reportedly thin endometiral stipe noted on sono.  Pt reports passage of large clot at home earlier this week, reports she felt a sensation like she had to have a bowel movement. Her sister helped to remove the clot. Pt also continues to have moderate to severe cramping and intermittent lightheadedness/dizziness. Also reports headache.

## 2024-03-08 NOTE — PLAN
[FreeTextEntry1] : 23y/o presents for followup after extramural delivery if MCDA TIUP. Pt was taken to [facility], records scanned in. Pt with pelvic pain out of proportion with normal postpartum cramping. Bedside sono shows thickened endometrium to 3cm, concern for retained placenta -Pt counseled on management options: endometrium too distended for safe administration of misoprostol. Discussed D&C vs MVA, give the recent trauma of extramural delivery, would recommend against MVA In the office with pt awake. Pt agrees to D&C in the OR. Booking form submitted -CBC sent today, concern for blood loss anemia -Continue iron and Vit C -Bleeding precautions reviewed  -Extensive discussion on prenatal course and possible contributory factors to fetal loss. Pt and mother appreciative of time spent   champ WRIGHT

## 2024-03-08 NOTE — PROCEDURE
[Pelvic Pain] : pelvic pain [de-identified] : Transabdominal ultrasound [FreeTextEntry3] : Endometrium grossly distended to 3cm with heterogenous material noted [FreeTextEntry4] : Endometrium thickened to 3cm, concern for retained placenta.

## 2024-03-08 NOTE — PHYSICAL EXAM
[Chaperone Present] : A chaperone was present in the examining room during all aspects of the physical examination [FreeTextEntry1] : TIBURCIO Thorpe [Appropriately responsive] : appropriately responsive [Alert] : alert [No Lymphadenopathy] : no lymphadenopathy [No Murmurs] : no murmurs [Regular Rate Rhythm] : regular rate rhythm [Clear to Auscultation B/L] : clear to auscultation bilaterally [Soft] : soft [Non-distended] : non-distended [No HSM] : No HSM [No Lesions] : no lesions [FreeTextEntry7] : Diffuse tenderness to palpation [Oriented x3] : oriented x3 [No Mass] : no mass

## 2024-03-08 NOTE — PROGRESS NOTE ADULT - SUBJECTIVE AND OBJECTIVE BOX
Gyn Progress Note POD#2  HD#3    Subjective:   Pt seen and examined at bedside. Patient evaluated for upper abdominal pain last night that was felt 2/2 to gas. Pain has since improved per patient. Patient passing flatus      Pain well controlled. Patient ambulating/not yet ambulating. Passing flatus/Not yet passing flatus  Tolerating regular diet. Pt denies fever, chills, chest pain, SOB, nausea, vomiting, lightheadedness, dizziness.      Objective:  T(F): 97.5 (24 @ 05:25), Max: 98.1 (24 @ 21:59)  HR: 68 (24 @ 05:25) (68 - 83)  BP: 92/52 (24 @ 05:25) (92/52 - 103/64)  RR: 16 (24 @ 05:25) (16 - 17)  SpO2: 100% (24 @ 05:25) (100% - 100%)  Wt(kg): --  I&O's Summary    06 Mar 2024 07:01  -  07 Mar 2024 07:00  --------------------------------------------------------  IN: 2500 mL / OUT: 2100 mL / NET: 400 mL    07 Mar 2024 07:01  -  08 Mar 2024 06:21  --------------------------------------------------------  IN: 1770 mL / OUT: 1900 mL / NET: -130 mL      CAPILLARY BLOOD GLUCOSE          MEDICATIONS  (STANDING):  acetaminophen     Tablet .. 975 milliGRAM(s) Oral every 6 hours  cefTRIAXone   IVPB 1000 milliGRAM(s) IV Intermittent every 24 hours  heparin   Injectable 5000 Unit(s) SubCutaneous every 12 hours  ibuprofen  Tablet. 600 milliGRAM(s) Oral every 6 hours  influenza   Vaccine 0.5 milliLiter(s) IntraMuscular once    MEDICATIONS  (PRN):  simethicone 80 milliGRAM(s) Chew every 6 hours PRN Gas      Physical Exam:  Constitutional: NAD, Lying in bed   CV: Regular rate and rhythm. No murmurs appreciated   Lungs: Clear to auscultation  bilaterally. No respiratory distress  Abdomen: Soft. Appropriately tender. Non-distended. Bowel sounds present  : Aragon in place   Incision: *** c/d/i  Extremities: No calf tenderness bilaterally    LABS:              Urinalysis Basic - ( 06 Mar 2024 08:14 )    Color: Yellow / Appearance: Clear / S.013 / pH: x  Gluc: x / Ketone: Negative mg/dL  / Bili: Negative / Urobili: 0.2 mg/dL   Blood: x / Protein: Negative mg/dL / Nitrite: Negative   Leuk Esterase: Trace / RBC: 2 /HPF / WBC 3 /HPF   Sq Epi: x / Non Sq Epi: 3 /HPF / Bacteria: Negative /HPF        Assessment/Plan: 24y POD#, s/p     Neuro: Acetaminophen, Toradol, and Oxycodone PRN  CV: Hemodynamically stable  Pulm: Saturating well on RA. Increase incentive spirometry.  GI: *** diet   : Aragon in place. Adequate UOP vs Voiding Spontaneously  Heme: HSQ for DVT ppx  FEN: LR@     , replete electrolytes PRN  ID: Afebrile  Endo: No active issues   Dispo: Continue inpatient care    Aleksey Gilliam, PGY-2           Gyn Progress Note POD#2  HD#3    Subjective:   Pt seen and examined at bedside. Patient evaluated for upper abdominal pain last night that was felt 2/2 to gas. Pain has since improved per patient. Patient passing flatus     Patient ambulating and tolerating regular diet. Denies fevers, chills, chest pain, shortness of breath, n/v, light-headedness, or dizziness     Objective:  T(F): 97.5 (24 @ 05:25), Max: 98.1 (24 @ 21:59)  HR: 68 (24 @ 05:25) (68 - 83)  BP: 92/52 (24 @ 05:25) (92/52 - 103/64)  RR: 16 (24 @ 05:25) (16 - 17)  SpO2: 100% (24 @ 05:25) (100% - 100%)  Wt(kg): --  I&O's Summary    06 Mar 2024 07:01  -  07 Mar 2024 07:00  --------------------------------------------------------  IN: 2500 mL / OUT: 2100 mL / NET: 400 mL    07 Mar 2024 07:01  -  08 Mar 2024 06:21  --------------------------------------------------------  IN: 1770 mL / OUT: 1900 mL / NET: -130 mL      CAPILLARY BLOOD GLUCOSE          MEDICATIONS  (STANDING):  acetaminophen     Tablet .. 975 milliGRAM(s) Oral every 6 hours  cefTRIAXone   IVPB 1000 milliGRAM(s) IV Intermittent every 24 hours  heparin   Injectable 5000 Unit(s) SubCutaneous every 12 hours  ibuprofen  Tablet. 600 milliGRAM(s) Oral every 6 hours  influenza   Vaccine 0.5 milliLiter(s) IntraMuscular once    MEDICATIONS  (PRN):  simethicone 80 milliGRAM(s) Chew every 6 hours PRN Gas      Physical Exam:  Constitutional: NAD, Lying in bed   CV: Regular rate and rhythm. No murmurs appreciated   Lungs: Clear to auscultation  bilaterally. No respiratory distress  Abdomen: Soft. Mildy distended and tympanitic. Tender in the lower abdomen   Extremities: No calf tenderness bilaterally    LABS:              Urinalysis Basic - ( 06 Mar 2024 08:14 )    Color: Yellow / Appearance: Clear / S.013 / pH: x  Gluc: x / Ketone: Negative mg/dL  / Bili: Negative / Urobili: 0.2 mg/dL   Blood: x / Protein: Negative mg/dL / Nitrite: Negative   Leuk Esterase: Trace / RBC: 2 /HPF / WBC 3 /HPF   Sq Epi: x / Non Sq Epi: 3 /HPF / Bacteria: Negative /HPF

## 2024-03-08 NOTE — PROGRESS NOTE ADULT - ASSESSMENT
25yo  s/p recent delivery of pregnancy at 18 week (miscarriage)on 2024 managed at outside hospital. Reports having had a significant blood loss at the time but states no other interventions aside from monitoring were done at the outside hospital. Since that time the patient has been experiencing include headache, cramping, lower abdominal pain myalgias, and back pain. Upon presentation patient was found to be febrile to Tmax of 102.2 with tachycardia and borderline lower BPs. Labs showed WBC of 19.97 which is now 17.9, H/H initially 6.6/20.0 now 8.9/26.3 s/p 5 units of PRBCs, betaHCG 4226 and UA not indicative of infection. CXR was performed showing clear lungs. Patient was taken to the OR on 3/6 and was found to have retained placenta and underwent D&C. Now blood cultures from 3/6 are growing 2/4 bottles E.coli without resistance genes on PCR.    Antimicrobials:  Ertapenem 3/6    Assessment:  *E.coli bacteremia in 2/4 bottles from blood cultures taken on 3/6, likely secondary to retained products of conception s/p I&D 3/6   *Sepsis as evidenced by fever, leukocytosis and tachycardia secondary to above  *Delivery at 18 weeks on 24  * positive culture finding with placental cx with E coli.       Recommendations:   - c/w Ceftriaxone 1g IV QD to cover E.coli, while inpt   - repeat blood cultures NTD   -OR cx with e coli  - trend WBC, resolved leucocytosis   - can transition to po cipro 500 mg q12h on discharge to finish 7 days from negative blood cx.     Will sign off, please call with questions.     Candi Knapp  Please contact through MS Teams   If no response or past 5 pm/weekend call [phone].

## 2024-03-08 NOTE — PROGRESS NOTE ADULT - ASSESSMENT
Assessment/Plan: 25yo POD#2, s/p DVC septic ab/retained placenta after extramural delivery of TIUP at 18wks on 2/25. Inpatient clinical course notable for E. coli bacteremia found on cultures obtained on presentation. Nevertheless, patient has been afebrile aside from her initial fever and is progressing otherwise appropriately post-op.     Neuro: Acetaminophen and Ibuprofen  CV: Hemodynamically stable  - AM CBC pending   Pulm: Saturating well on RA   GI: Regular diet   - Simethicone PRN for gas pain. Continued to encourage ambulation as tolerated   : Voiding Spontaneously  Heme: HSQ for DVT ppx  - Hct: 20-> 2U PRBC -> 26.9 -> (AM CBC pending)  FEN: SLIV. Replete electrolytes PRN  ID: Afebrile  - WBC: 16.7->19.3->17.9-> (AM CBC pending)  - Blood Cultures (3/6): E. Coli  - CXR (3/6): clear lungs  - s/p Ertapanem (3/6-3/7). ID recommending Ceftriaxone (3/8-) pending sensitivities  -> Appreciating ID recs   - Surgical swab growing E. coli    Endo: No active issues   Dispo: Continue inpatient care and IV antibiotics     Aleksey Gilliam  PGY-2, Obstetrics & Gynecology

## 2024-03-09 ENCOUNTER — TRANSCRIPTION ENCOUNTER (OUTPATIENT)
Age: 24
End: 2024-03-09

## 2024-03-09 VITALS
RESPIRATION RATE: 16 BRPM | HEART RATE: 83 BPM | DIASTOLIC BLOOD PRESSURE: 60 MMHG | OXYGEN SATURATION: 98 % | SYSTOLIC BLOOD PRESSURE: 108 MMHG | TEMPERATURE: 99 F

## 2024-03-09 RX ADMIN — Medication 600 MILLIGRAM(S): at 04:35

## 2024-03-09 RX ADMIN — CEFTRIAXONE 100 MILLIGRAM(S): 500 INJECTION, POWDER, FOR SOLUTION INTRAMUSCULAR; INTRAVENOUS at 11:41

## 2024-03-09 RX ADMIN — Medication 600 MILLIGRAM(S): at 05:35

## 2024-03-09 NOTE — PROGRESS NOTE ADULT - SUBJECTIVE AND OBJECTIVE BOX
Gyn Progress Note POD#3 HD#4    Subjective:   Pt seen and examined at bedside. No events overnight. Pain well controlled. Patient ambulating. Passing flatus. Tolerating regular diet. Pt denies fever, chills, chest pain, SOB, nausea, vomiting, lightheadedness, dizziness.      Objective:  T(F): 98.2 (03-08-24 @ 21:34), Max: 98.6 (03-08-24 @ 13:30)  HR: 81 (03-08-24 @ 21:34) (75 - 89)  BP: 99/54 (03-08-24 @ 21:34) (94/58 - 99/54)  RR: 16 (03-08-24 @ 21:34) (16 - 17)  SpO2: 100% (03-08-24 @ 21:34) (99% - 100%)  Wt(kg): --  I&O's Summary    07 Mar 2024 07:01  -  08 Mar 2024 07:00  --------------------------------------------------------  IN: 1770 mL / OUT: 1900 mL / NET: -130 mL    08 Mar 2024 07:01  -  09 Mar 2024 05:33  --------------------------------------------------------  IN: 1250 mL / OUT: 3000 mL / NET: -1750 mL      CAPILLARY BLOOD GLUCOSE          MEDICATIONS  (STANDING):  acetaminophen     Tablet .. 975 milliGRAM(s) Oral every 6 hours  cefTRIAXone   IVPB 1000 milliGRAM(s) IV Intermittent every 24 hours  heparin   Injectable 5000 Unit(s) SubCutaneous every 12 hours  ibuprofen  Tablet. 600 milliGRAM(s) Oral every 6 hours  influenza   Vaccine 0.5 milliLiter(s) IntraMuscular once    MEDICATIONS  (PRN):  simethicone 80 milliGRAM(s) Chew every 6 hours PRN Gas      Physical Exam:  Constitutional: NAD, Lying in bed   CV: Regular rate and rhythm. No murmurs appreciated   Lungs: Clear to auscultation  bilaterally. No respiratory distress  Abdomen: Soft, nontender, nondistended  Extremities: No calf tenderness bilaterally        LABS:            8.4      8.48 )------------( 369        ( 03-08-24 @ 06:15 )            25.4

## 2024-03-09 NOTE — DISCHARGE NOTE NURSING/CASE MANAGEMENT/SOCIAL WORK - PATIENT PORTAL LINK FT
You can access the FollowMyHealth Patient Portal offered by [facility] by registering at the following website: [email or web address]. By joining [employer]’s FollowMyHealth portal, you will also be able to view your health information using other applications (apps) compatible with our system.

## 2024-03-09 NOTE — PROGRESS NOTE ADULT - ASSESSMENT
25yo POD#3, s/p DVC septic ab/retained placenta after extramural delivery of TIUP at 18wks on 2/25. Inpatient clinical course notable for E. coli bacteremia found on cultures obtained on presentation. Nevertheless, patient has been afebrile aside from her initial fever and is progressing otherwise appropriately post-op.     Neuro: Acetaminophen and Ibuprofen  CV: Hemodynamically stable  Pulm: Saturating well on RA   GI: Regular diet   - Simethicone PRN for gas pain. Continued to encourage ambulation as tolerated   : Voiding Spontaneously  Heme: HSQ for DVT ppx  - Hct: 20-> 2U PRBC -> 26.9 ->26.3->25.4  FEN: SLIV. Replete electrolytes PRN  ID: Afebrile  - WBC: 16.7->19.3->17.9->8.48  - Blood Cultures (3/6): E. Coli  - Blood Cultures (3/7): NGTD  - CXR (3/6): clear lungs  - s/p Ertapanem (3/6-3/7). ID recommending Ceftriaxone (3/8-), sensitivities available  -> Appreciating ID recs   - Surgical swab growing E. coli    Endo: No active issues   ID: can transition to po cipro 500 mg q12h on discharge to finish 7 days from negative blood cx  Dispo: Continue inpatient care and IV antibiotics    Martita Ulrich, PGY2 25yo POD#3, s/p DVC septic ab/retained placenta after extramural delivery of TIUP at 18wks on 2/25. Inpatient clinical course notable for E. coli bacteremia found on cultures obtained on presentation.  Patient has been afebrile aside from her initial fever and is progressing otherwise appropriately post-op.     Neuro: Acetaminophen and Ibuprofen  CV: Hemodynamically stable  Pulm: Saturating well on RA   GI: Regular diet   - Simethicone PRN for gas pain. Continued to encourage ambulation as tolerated   : Voiding Spontaneously  Heme: HSQ for DVT ppx  - Hct: 20-> 2U PRBC -> 26.9 ->26.3->25.4  FEN: SLIV. Replete electrolytes PRN  ID: Afebrile  - WBC: 16.7->19.3->17.9->8.48  - Blood Cultures (3/6): E. Coli  - Blood Cultures (3/7): NGTD  - CXR (3/6): clear lungs  - s/p Ertapanem (3/6-3/7). ID recommending Ceftriaxone (3/8-), sensitivities available  -> Appreciating ID recs   - Surgical swab growing E. coli    Endo: No active issues   ID: can transition to po cipro 500 mg q12h on discharge to finish 7 days from negative blood cx  Dispo: Continue inpatient care and IV antibiotics    Martita Ulrich, PGY2

## 2024-03-09 NOTE — PROGRESS NOTE ADULT - ATTENDING COMMENTS
Pt seen and evaluated at bedside  Agree with above  Pt clinically improved  BCx from 3/7 NGTD  Appreciate ID input  D/c on PO antibiotics  F/u in office in 2 weeks     champ WRIGHT
Pt reports feeling very well and improved.  She denies chills.  Reports minimal bleeding.  She denies  CP or SOB    Abd--soft, ND, mildly tender suprapubic area,   Ext NT    Pt improved POD# 1 s/p DVC for retained POCs after SAB of twin pregnancy at 17wks, with E coli bacteremia on preop BCx  ID consulted and rpt BCx sent  Continue Invanz  Plan discussed with pt
Attending, late entry  Pt seen by me on 3/8 @ 4pm  She reports feeling well.  Minimal VB and minimal abdominal pain.  No chills.  ID f/up appreciated  Will f/up Rpt Bcx from 3/7, if neg will send home on Cipro for 7 days post neg cx.  D/w pt

## 2024-03-11 LAB
CULTURE RESULTS: ABNORMAL
ORGANISM # SPEC MICROSCOPIC CNT: ABNORMAL
ORGANISM # SPEC MICROSCOPIC CNT: ABNORMAL
SPECIMEN SOURCE: SIGNIFICANT CHANGE UP

## 2024-03-12 LAB
CULTURE RESULTS: SIGNIFICANT CHANGE UP
CULTURE RESULTS: SIGNIFICANT CHANGE UP
SPECIMEN SOURCE: SIGNIFICANT CHANGE UP
SPECIMEN SOURCE: SIGNIFICANT CHANGE UP
SURGICAL PATHOLOGY STUDY: SIGNIFICANT CHANGE UP

## 2024-03-20 ENCOUNTER — APPOINTMENT (OUTPATIENT)
Dept: ANTEPARTUM | Facility: CLINIC | Age: 24
End: 2024-03-20

## 2024-03-20 LAB
CULTURE RESULTS: SIGNIFICANT CHANGE UP
SPECIMEN SOURCE: SIGNIFICANT CHANGE UP

## 2024-03-22 ENCOUNTER — APPOINTMENT (OUTPATIENT)
Dept: OBGYN | Facility: CLINIC | Age: 24
End: 2024-03-22
Payer: MEDICAID

## 2024-03-22 VITALS
HEART RATE: 81 BPM | DIASTOLIC BLOOD PRESSURE: 71 MMHG | HEIGHT: 60 IN | SYSTOLIC BLOOD PRESSURE: 106 MMHG | WEIGHT: 116 LBS | BODY MASS INDEX: 22.78 KG/M2

## 2024-03-22 DIAGNOSIS — Z30.42 ENCOUNTER FOR SURVEILLANCE OF INJECTABLE CONTRACEPTIVE: ICD-10-CM

## 2024-03-22 PROCEDURE — 96372 THER/PROPH/DIAG INJ SC/IM: CPT

## 2024-03-22 PROCEDURE — 99459 PELVIC EXAMINATION: CPT

## 2024-03-22 PROCEDURE — 99214 OFFICE O/P EST MOD 30 MIN: CPT | Mod: 25

## 2024-03-22 RX ORDER — MULTIVIT-MIN/IRON/FOLIC ACID/K 18-600-40
CAPSULE ORAL
Refills: 0 | Status: ACTIVE | COMMUNITY

## 2024-03-22 RX ORDER — MEDROXYPROGESTERONE ACETATE 150 MG/ML
150 INJECTION, SUSPENSION INTRAMUSCULAR
Qty: 0 | Refills: 0 | Status: COMPLETED | OUTPATIENT
Start: 2024-03-22

## 2024-03-22 RX ORDER — FOLIC ACID/MULTIVIT,IRON,MINER 0.4MG-18MG
200 TABLET ORAL
Qty: 90 | Refills: 3 | Status: DISCONTINUED | COMMUNITY
Start: 2024-01-23 | End: 2024-03-22

## 2024-03-22 RX ORDER — IRON/IRON ASP GLY/FA/MV-MIN 38 125-25-1MG
TABLET ORAL
Refills: 0 | Status: ACTIVE | COMMUNITY

## 2024-03-22 RX ADMIN — MEDROXYPROGESTERONE ACETATE 0 MG/ML: 150 INJECTION, SUSPENSION INTRAMUSCULAR at 00:00

## 2024-03-23 NOTE — PHYSICAL EXAM
[Chaperone Present] : A chaperone was present in the examining room during all aspects of the physical examination [FreeTextEntry1] : TIBURCIO Byrd [Appropriately responsive] : appropriately responsive [Alert] : alert [No Lymphadenopathy] : no lymphadenopathy [No Acute Distress] : no acute distress [Regular Rate Rhythm] : regular rate rhythm [No Murmurs] : no murmurs [Soft] : soft [Clear to Auscultation B/L] : clear to auscultation bilaterally [No HSM] : No HSM [Non-distended] : non-distended [Non-tender] : non-tender [No Mass] : no mass [No Lesions] : no lesions [Oriented x3] : oriented x3 [Labia Majora] : normal [Labia Minora] : normal [Discharge] : a  ~M vaginal discharge was present [Akbar] : yellow [Scant] : There was scant vaginal bleeding [Normal] : normal [Tenderness] : nontender [Enlarged ___ wks] : not enlarged [Uterine Adnexae] : normal

## 2024-03-23 NOTE — COUNSELING
[Contraception/ Emergency Contraception/ Safe Sexual Practices] : contraception, emergency contraception, safe sexual practices [Medication Management] : medication management [Lab Results] : lab results [TextEntry] : All forms of reversible contraception including combined hormonal contraception, progestin-only contraceptives, IUDs, and implants were reviewed with the patient. The risks, benefits, and alternatives were discussed.  CHC including pill, patch, and ring, was discussed with the patient. Common side-effects of CHC were reviewed. Typical effectiveness rates of 91% were reviewed.  Progestin-only contraceptives including progestin-only pills, DMPA, the subdermal implant and the hormonal IUDs were discussed with the patient. The patient was counseled about the risks and benefits of POP's, DMPA, implant, and hormonal IUD contraception. Common side-effects of progestin-only contraceptives including changes in bleeding patterns were reviewed.  The need to return for injections Q 3 months was reviewed. Typical effectiveness rates of 94% were reviewed. The potential for irregular bleeding associated with DMPA was reviewed and all questions answered.  The potential for irregular bleeding associated with the implant was reviewed and all questions answered.  The possibility of irregular bleeding in the first 3-6 months of Mirena use followed by increased likelihood of amenorrhea was reviewed and all questions answered.  The non-hormonal copper IUD was also reviewed and the potential for heavier, crampier, or longer periods with ParaGard. The patient was told that this may improve over time.  She is aware that hormonal contraceptives, IUDs and implants do not protect against sexually transmitted diseases and encouraged her to use condoms with her contraception if she is at risk for a STD.  She was also told to call with any problematic side-effects to discuss management or changing to another contraceptive method before discontinuing.

## 2024-03-23 NOTE — HISTORY OF PRESENT ILLNESS
[FreeTextEntry1] : 23y/o presents for post op visit  Pt is s/p D&C for retained placenta c/b e.coli bacteremia  Pt reports she had a period last week. Denies bleeding today. Denies pain. Denies fevers/chills. Overall feeling much better Pt desires contraception, has not been sexually active since D&C Reports they are in the process of burial for the twins and are currently in counseling

## 2024-03-23 NOTE — PLAN
[FreeTextEntry1] : 25y/o presents for post op visit s/p D&C for retained placenta c/b e. coli bacteremia  -Vaginitis plus sent on discharge today  -Pt healing well -Contraception counseling as above, pt desires Depo, given today  -Pt is in counseling, doing well   champ WRIGHT

## 2024-03-29 ENCOUNTER — APPOINTMENT (OUTPATIENT)
Dept: OBGYN | Facility: CLINIC | Age: 24
End: 2024-03-29

## 2024-03-29 LAB
A VAGINAE DNA VAG QL NAA+PROBE: NORMAL
BVAB2 DNA VAG QL NAA+PROBE: NORMAL
C KRUSEI DNA VAG QL NAA+PROBE: NORMAL
C TRACH RRNA SPEC QL NAA+PROBE: NEGATIVE
CANDIDA DNA VAG QL NAA+PROBE: NORMAL
MEGA1 DNA VAG QL NAA+PROBE: NORMAL
N GONORRHOEA RRNA SPEC QL NAA+PROBE: NEGATIVE
T VAGINALIS RRNA SPEC QL NAA+PROBE: NEGATIVE

## 2024-04-03 NOTE — BRIEF OPERATIVE NOTE - NSEVIDNCEINFORABSCESSFT_GEN_ALL_CORE
KNEE: Extension, Long Arc Quads - Sitting        Raise leg until knee is straight.  ___ reps per set, ___ sets per day, ___ days per week    Copyright © [facility]. All rights reserved.   High Stepping in Place (Sitting)        Sitting, alternately lift knees as high as possible. Keep torso erect.  Repeat ____ times, each leg.    Copyright © I. All rights reserved.   Abduction: Isometric - Bilateral (Sitting)        Position Patient: Keep legs slightly apart, feet flat. [place]: Place hands on outside of both knees. Motion - Cue patient to press legs apart. - [place] blocks movement.  Hold ___ seconds. Relax. Repeat ___ times. Do ___ sessions per day. Variation: Perform with knees ___ inches apart.    Copyright © [employer] All rights reserved.   Adduction: Hip - Knees Together (Sitting)        Sit with towel roll between knees. Push knees together. Hold for ___ seconds. Rest for ___ seconds.  Repeat ___ times. Do ___ times a day.    Copyright © [employer] All rights reserved.   Ankle Bend: Dorsiflexion / Plantar Flexion, Sitting        Sit with feet on floor. Point toes up, keeping both heels on floor. Then press toes to floor raising heels. Hold each position ___ seconds.  Repeat ___ times per session. Do ___ sessions per day.    Copyright © [employer]. All rights reserved.   KNEE: Extension, Long Arc Quads - Sitting      Copyright © I. All rights reserved.     
intrauterine

## 2024-04-26 ENCOUNTER — APPOINTMENT (OUTPATIENT)
Dept: OBGYN | Facility: CLINIC | Age: 24
End: 2024-04-26

## 2024-05-21 ENCOUNTER — APPOINTMENT (OUTPATIENT)
Dept: OBGYN | Facility: CLINIC | Age: 24
End: 2024-05-21

## 2024-06-10 ENCOUNTER — APPOINTMENT (OUTPATIENT)
Dept: OBGYN | Facility: CLINIC | Age: 24
End: 2024-06-10
Payer: MEDICAID

## 2024-06-10 PROCEDURE — 99211 OFF/OP EST MAY X REQ PHY/QHP: CPT

## 2024-06-10 RX ORDER — MEDROXYPROGESTERONE ACETATE 150 MG/ML
150 INJECTION, SUSPENSION INTRAMUSCULAR
Qty: 0 | Refills: 0 | Status: COMPLETED | OUTPATIENT
Start: 2024-06-10

## 2024-06-10 RX ADMIN — MEDROXYPROGESTERONE ACETATE 0 MG/ML: 150 INJECTION, SUSPENSION INTRAMUSCULAR at 00:00

## 2024-08-22 NOTE — DISCHARGE NOTE NURSING/CASE MANAGEMENT/SOCIAL WORK - NSDCFUADDAPPT_GEN_ALL_CORE_FT
Received request via: Pharmacy    Was the patient seen in the last year in this department? Yes    Does the patient have an active prescription (recently filled or refills available) for medication(s) requested? No    Pharmacy Name: [facility]    Does the patient have detention Plus and need 100-day supply? (This applies to ALL medications) Yes, quantity updated to 100 days   Please follow-up with Dr. Felix as scheduled on 3/22/2024
